# Patient Record
Sex: MALE | Race: WHITE | URBAN - METROPOLITAN AREA
[De-identification: names, ages, dates, MRNs, and addresses within clinical notes are randomized per-mention and may not be internally consistent; named-entity substitution may affect disease eponyms.]

---

## 2022-04-11 ENCOUNTER — HISTORICAL (OUTPATIENT)
Dept: ADMINISTRATIVE | Facility: HOSPITAL | Age: 67
End: 2022-04-11

## 2022-04-29 VITALS
WEIGHT: 180 LBS | BODY MASS INDEX: 26.66 KG/M2 | SYSTOLIC BLOOD PRESSURE: 112 MMHG | DIASTOLIC BLOOD PRESSURE: 88 MMHG | HEIGHT: 69 IN

## 2025-02-12 DIAGNOSIS — M17.0 BILATERAL PRIMARY OSTEOARTHRITIS OF KNEE: Primary | ICD-10-CM

## 2025-03-12 ENCOUNTER — HOSPITAL ENCOUNTER (OUTPATIENT)
Dept: RADIOLOGY | Facility: CLINIC | Age: 70
Discharge: HOME | End: 2025-03-12
Attending: PHYSICIAN ASSISTANT
Payer: MEDICARE

## 2025-03-12 ENCOUNTER — OFFICE VISIT (OUTPATIENT)
Dept: ORTHOPEDICS | Facility: CLINIC | Age: 70
End: 2025-03-12
Payer: MEDICARE

## 2025-03-12 VITALS
DIASTOLIC BLOOD PRESSURE: 86 MMHG | SYSTOLIC BLOOD PRESSURE: 151 MMHG | BODY MASS INDEX: 28.77 KG/M2 | HEIGHT: 68 IN | WEIGHT: 189.81 LBS | HEART RATE: 70 BPM

## 2025-03-12 DIAGNOSIS — M17.0 BILATERAL PRIMARY OSTEOARTHRITIS OF KNEE: Primary | ICD-10-CM

## 2025-03-12 DIAGNOSIS — M17.0 BILATERAL PRIMARY OSTEOARTHRITIS OF KNEE: ICD-10-CM

## 2025-03-12 PROCEDURE — 99203 OFFICE O/P NEW LOW 30 MIN: CPT | Mod: ,,, | Performed by: PHYSICIAN ASSISTANT

## 2025-03-12 PROCEDURE — 73564 X-RAY EXAM KNEE 4 OR MORE: CPT | Mod: 50,,, | Performed by: PHYSICIAN ASSISTANT

## 2025-03-12 RX ORDER — LISINOPRIL 10 MG/1
TABLET ORAL
COMMUNITY

## 2025-03-12 RX ORDER — OMEPRAZOLE 20 MG/1
CAPSULE, DELAYED RELEASE ORAL
COMMUNITY

## 2025-03-12 RX ORDER — CHOLECALCIFEROL (VITAMIN D3) 25 MCG
1000 TABLET ORAL DAILY
COMMUNITY

## 2025-03-12 RX ORDER — ZINC GLUCONATE 50 MG
50 TABLET ORAL DAILY
COMMUNITY

## 2025-03-12 RX ORDER — GUAIFENESIN/PHENYLPROPANOLAMIN
EXPECTORANT ORAL
COMMUNITY

## 2025-03-12 RX ORDER — EZETIMIBE 10 MG/1
TABLET ORAL
COMMUNITY

## 2025-03-12 RX ORDER — MULTIVITAMIN
1 TABLET ORAL DAILY
COMMUNITY

## 2025-03-12 RX ORDER — IBUPROFEN 100 MG/5ML
1000 SUSPENSION, ORAL (FINAL DOSE FORM) ORAL DAILY
COMMUNITY

## 2025-03-12 RX ORDER — GLUCOSAM/CHONDRO/HERB 149/HYAL 750-100 MG
TABLET ORAL
COMMUNITY

## 2025-03-12 RX ORDER — ROSUVASTATIN CALCIUM 20 MG/1
20 TABLET, COATED ORAL
COMMUNITY
Start: 2025-01-27

## 2025-03-12 NOTE — PROGRESS NOTES
Chief Complaint:   Chief Complaint   Patient presents with    Knee Pain     SLIM knee pain - Left knee was scoped. Patient reports L knee pain started after basketball injury 35 years ago. R knee has become progressively worse over time. Pain is localized to the knees, is relatively the same in both knees. . Reports mild swelling with activity. Taking Ibuprofen 200 mg PRN or prior to activity with relief. No PT.        History of present illness:    69-year-old male presents office today with his wife for his bilateral knee pain.  Left knee is worse than the right.  He has a longstanding history of left knee pain stemming from a basketball injury that he had a left knee arthroscopy for the torn meniscus.  Pain is lateral-sided as well as anteriorly.  He has difficulty kneeling on his knees.  He gets swelling with activity.  His pain is worse with weight-bearing.  He denies any locking or catching episodes.  Takes over-the-counter medication with no relief    History reviewed. No pertinent past medical history.    Past Surgical History:   Procedure Laterality Date    HERNIA REPAIR  1961       Current Outpatient Medications   Medication Sig    ascorbic acid, vitamin C, (VITAMIN C) 1000 MG tablet Take 1,000 mg by mouth once daily.    BABY ASPIRIN ORAL Take 81 mg by mouth every morning.    ezetimibe (ZETIA) 10 mg tablet     glucosam-chond-hrb 149-hyal ac (GLUCOS CHOND CPLX ADVANCED) 750 mg-100 mg- 125 mg-1.65 mg Tab     lisinopriL 10 MG tablet     multivitamin (ONE DAILY MULTIVITAMIN) per tablet Take 1 tablet by mouth once daily.    omeprazole (PRILOSEC) 20 MG capsule     rosuvastatin (CRESTOR) 20 MG tablet Take 20 mg by mouth.    saw palmetto 500 MG capsule     vitamin D (VITAMIN D3) 1000 units Tab Take 1,000 Units by mouth once daily.    zinc gluconate 50 mg tablet Take 50 mg by mouth once daily.     No current facility-administered medications for this visit.       Review of patient's allergies indicates:  No Known  "Allergies    Family History   Problem Relation Name Age of Onset    Asthma Mother Pinky Peterson     Heart disease Mother Pinky Peterson     Cancer Father Refugio Deng        Social History[1]      Review of Systems:    Constitution:   Denies chills, fever, and sweats.  HENT:   Denies headaches or blurry vision.  Cardiovascular:  Denies chest pain or irregular heart beat.  Respiratory:   Denies cough or shortness of breath.  Gastrointestinal:  Denies abdominal pain, nausea, or vomiting.  Musculoskeletal:   Denies muscle cramps.  Neurological:   Denies dizziness or focal weakness.  Psychiatric/Behavior: Normal mental status.  Hematology/Lymph:  Denies bleeding problem or easy bruising/bleeding.  Skin:    Denies rash or suspicious lesions.    Examination:    Vital Signs:    Vitals:    03/12/25 1420   BP: (!) 150/89   Pulse: 72   Weight: 86.1 kg (189 lb 12.8 oz)   Height: 5' 8" (1.727 m)       Body mass index is 28.86 kg/m².    Constitution:   Well-developed, well nourished patient in no acute distress.  Neurological:   Alert and oriented x 3 and cooperative to examination.     Psychiatric/Behavior: Normal mental status.  Respiratory:   No shortness of breath.  Nonlabored breathing  Cardiovascular:           Regular rate and rhythm  Eyes:    Extraoccular muscles intact  Skin:    No scars, rash or suspicious lesions.    Physical Exam:     Right Knee:     Grade 1 effusion    Valgus deformity    No erythema or increased heat   Patella demonstrated crepitus.    Anterolateral aspect was tender on palpation. Lateral aspect was tender on palpation. Iliotibial Band was tender on palpation.    No medial joint line pain   Active flexion 120 degrees   Active extension 5 degrees   antalgic gait was observed.     X-Ray Knee: A complete knee x-ray with standing 4 views was performed of the right knee      IMPRESSIONS RADIOLOGY TEST   Narrowing of the joint space bone on bone in lateral compartment, and osteophytes arising from the " knee.  Advanced joint space narrowing in the patellofemoral compartments    Kellgren Levi grade 4 changes    Left Knee:     Grade 1 effusion    Valgus deformity    No erythema or increased heat   Patella demonstrated crepitus.    Anterolateral aspect was tender on palpation. Lateral aspect was tender on palpation. Iliotibial Band was tender on palpation.    No medial joint line pain   Active flexion 120 degrees   Active extension 5 degrees   antalgic gait was observed.     X-Ray Knee: A complete knee x-ray with standing 4 views was performed of the left knee      IMPRESSIONS RADIOLOGY TEST   Narrowing of the joint space bone on bone in lateral compartment, and osteophytes arising from the knee.  Advanced joint space narrowing in the patellofemoral compartments    Kellgren Levi grade 4 changes        Assessment:     Bilateral primary osteoarthritis of knee  -     X-Ray Knee Complete 4 Or More Views Bilat; Future; Expected date: 03/12/2025  -     Ambulatory referral/consult to Orthopedics        Plan:      I have discussed exam and x-ray findings with the patient his wife today.  He has advanced osteoarthritis of both knees.  Conservative treatment consisting of injections, therapy, medication.  Definitive treatment consist of robotic assisted right and left total knee arthroplasty.  He is interested in definitive treatment.  He would like do both knees at 1 time.  He has no medical comorbidities.    Robotic assisted right and left total knee arthroplasty in June per his request    Preoperative physical therapy    I will have him come back in May to further discuss surgical intervention with Dr. Horvath and we will perform his preoperative exam at that time    The proposed procedure as well as associated risks/benefits were discussed at length with the patient and family with risks to include pain, bleeding, infection, future surgeries, neurovascular compromise, loss of limb, heart attack, stroke, deep vein  thrombosis, and even death. They understand and agree with the treatment plan.        No follow-ups on file.    DISCLAIMER: This note may have been dictated using voice recognition software and may contain grammatical errors.          [1]   Social History  Socioeconomic History    Marital status:    Tobacco Use    Smoking status: Never     Passive exposure: Never    Smokeless tobacco: Never   Substance and Sexual Activity    Alcohol use: Not Currently     Comment: Not in  50 years    Drug use: Never    Sexual activity: Not Currently     Partners: Female      none

## 2025-04-02 RX ORDER — CYCLOSPORINE 0.5 MG/ML
1 EMULSION OPHTHALMIC 2 TIMES DAILY
COMMUNITY

## 2025-04-02 RX ORDER — DEXTROMETHORPHAN HYDROBROMIDE, GUAIFENESIN 5; 100 MG/5ML; MG/5ML
1300 LIQUID ORAL EVERY 8 HOURS
COMMUNITY

## 2025-04-07 ENCOUNTER — ANESTHESIA EVENT (OUTPATIENT)
Facility: HOSPITAL | Age: 70
End: 2025-04-07
Payer: MEDICARE

## 2025-04-07 RX ORDER — LIDOCAINE HYDROCHLORIDE 10 MG/ML
1 INJECTION, SOLUTION EPIDURAL; INFILTRATION; INTRACAUDAL; PERINEURAL ONCE
OUTPATIENT
Start: 2025-04-07 | End: 2025-04-07

## 2025-04-07 RX ORDER — SODIUM CHLORIDE, SODIUM GLUCONATE, SODIUM ACETATE, POTASSIUM CHLORIDE AND MAGNESIUM CHLORIDE 30; 37; 368; 526; 502 MG/100ML; MG/100ML; MG/100ML; MG/100ML; MG/100ML
INJECTION, SOLUTION INTRAVENOUS CONTINUOUS
OUTPATIENT
Start: 2025-04-07 | End: 2025-05-07

## 2025-04-07 NOTE — ANESTHESIA PREPROCEDURE EVALUATION
"                                                                                                             04/07/2025  Refugio Peterson is a 69 y.o., male.  Diagnosis: Nuclear sclerotic cataract of right eye [H25.11]   Pre-op diagnosis: Nuclear sclerotic cataract of right eye [H25.11]   Location: [address]           Case: [id number] Date/Time: 04/08/25 0722   Procedure: PHACOEMULSIFICATION, CATARACT, WITH IOL INSERTION     //////right eye; Panoptix Toric; Catalys (Right)   Anesthesia type: Monitor Anesthesia Care     PMHx:  Heart attack    Other Medical History   HLD (hyperlipidemia) GERD (gastroesophageal reflux disease)   CAD (coronary artery disease)          PSHx:  Surgical History:  HERNIA REPAIR KNEE SURGERY   COLONOSCOPY          Vital signs:    Most Recent Value 4/2/2025  1526 3/12/2025  1506 3/12/2025  1420    Height: 5' 9" (175.3 cm)  as of 4/2/2025 5' 9" (175.3 cm) -- 5' 8" (172.7 cm)   Last Wt: 82.6 kg (182 lb)  as of 4/2/2025 82.6 kg (182 lb) -- 86.1 kg (189 lb 12.8 oz)   Body Mass Index: 26.88 kg/m² Abnormal   5' 9" (175.3 cm)  as of 4/2/2025  82.6 kg (182 lb)  as of 4/2/2025      Pulse: 70  as of 3/12/2025 -- 70 72   BP: 151/86 Abnormal   as of 3/12/2025 -- 151/86 Abnormal  150/89 Abnormal         Nervous/in pain   Temp: Not taken -- -- --   SpO2: Not taken            Lab Data:  N/A    EKG:  N/A                Pre-op Assessment    I have reviewed the Patient Summary Reports.     I have reviewed the Nursing Notes. I have reviewed the NPO Status.   I have reviewed the Medications.     Review of Systems  Anesthesia Hx:  No problems with previous Anesthesia                Social:  Non-Smoker       Hematology/Oncology:  Hematology Normal   Oncology Normal                                   EENT/Dental:  EENT/Dental Normal           Cardiovascular:  Exercise tolerance: good     Past MI CAD                  Functional Capacity good / => 4 METS   Coronary Artery Disease:          Hx of " Myocardial Infarction                        Pulmonary:  Pulmonary Normal                       Renal/:  Renal/ Normal                 Hepatic/GI:     GERD         Gerd          Musculoskeletal:  Musculoskeletal Normal                Neurological:  Neurology Normal                                      Endocrine:  Endocrine Normal            Dermatological:  Skin Normal    Psych:  Psychiatric Normal                    Physical Exam  General: Alert, Oriented, Well nourished and Cooperative    Airway:  Mallampati: II   Mouth Opening: Normal  TM Distance: Normal  Tongue: Normal  Neck ROM: Normal ROM    Dental:  Intact    Chest/Lungs:  Clear to auscultation, Normal Respiratory Rate    Heart:  Rate: Normal  Rhythm: Regular Rhythm        Anesthesia Plan  Type of Anesthesia, risks & benefits discussed:    Anesthesia Type: Gen Natural Airway, MAC  Intra-op Monitoring Plan: Standard ASA Monitors  Post Op Pain Control Plan: IV/PO Opioids PRN  Induction:  IV  Informed Consent: Informed consent signed with the Patient and all parties understand the risks and agree with anesthesia plan.  All questions answered.   ASA Score: 3  Day of Surgery Review of History & Physical: H&P Update referred to the surgeon/provider.    Ready For Surgery From Anesthesia Perspective.     .

## 2025-04-08 ENCOUNTER — ANESTHESIA (OUTPATIENT)
Facility: HOSPITAL | Age: 70
End: 2025-04-08
Payer: MEDICARE

## 2025-04-08 ENCOUNTER — HOSPITAL ENCOUNTER (OUTPATIENT)
Facility: HOSPITAL | Age: 70
Discharge: HOME | End: 2025-04-08
Attending: OPHTHALMOLOGY | Admitting: OPHTHALMOLOGY
Payer: MEDICARE

## 2025-04-08 VITALS
TEMPERATURE: 98 F | DIASTOLIC BLOOD PRESSURE: 94 MMHG | BODY MASS INDEX: 26.96 KG/M2 | HEART RATE: 67 BPM | WEIGHT: 182 LBS | OXYGEN SATURATION: 99 % | HEIGHT: 69 IN | SYSTOLIC BLOOD PRESSURE: 166 MMHG

## 2025-04-08 DIAGNOSIS — H26.9 CATARACT: ICD-10-CM

## 2025-04-08 PROCEDURE — 37000008 HC ANESTHESIA 1ST 15 MINUTES: Performed by: OPHTHALMOLOGY

## 2025-04-08 PROCEDURE — 27201423 OPTIME MED/SURG SUP & DEVICES STERILE SUPPLY: Performed by: OPHTHALMOLOGY

## 2025-04-08 PROCEDURE — 36000707: Performed by: OPHTHALMOLOGY

## 2025-04-08 PROCEDURE — 71000015 HC POSTOP RECOV 1ST HR: Performed by: OPHTHALMOLOGY

## 2025-04-08 PROCEDURE — 63600175 PHARM REV CODE 636 W HCPCS: Performed by: OPHTHALMOLOGY

## 2025-04-08 PROCEDURE — 36000706: Performed by: OPHTHALMOLOGY

## 2025-04-08 PROCEDURE — 25000003 PHARM REV CODE 250

## 2025-04-08 PROCEDURE — 25000003 PHARM REV CODE 250: Performed by: OPHTHALMOLOGY

## 2025-04-08 PROCEDURE — 63600175 PHARM REV CODE 636 W HCPCS: Performed by: NURSE ANESTHETIST, CERTIFIED REGISTERED

## 2025-04-08 PROCEDURE — V2788 PRESBYOPIA-CORRECT FUNCTION: HCPCS | Performed by: OPHTHALMOLOGY

## 2025-04-08 PROCEDURE — 37000009 HC ANESTHESIA EA ADD 15 MINS: Performed by: OPHTHALMOLOGY

## 2025-04-08 DEVICE — CLAREON PANOPTIX TORIC UVA
Type: IMPLANTABLE DEVICE | Site: EYE | Status: FUNCTIONAL
Brand: CLAREON

## 2025-04-08 RX ORDER — BUPIVACAINE HYDROCHLORIDE 7.5 MG/ML
INJECTION, SOLUTION EPIDURAL; RETROBULBAR
Status: DISCONTINUED | OUTPATIENT
Start: 2025-04-08 | End: 2025-04-08 | Stop reason: HOSPADM

## 2025-04-08 RX ORDER — PHENYLEPHRINE HYDROCHLORIDE 100 MG/ML
1 SOLUTION/ DROPS OPHTHALMIC
Status: COMPLETED | OUTPATIENT
Start: 2025-04-08 | End: 2025-04-08

## 2025-04-08 RX ORDER — MIDAZOLAM HYDROCHLORIDE 1 MG/ML
INJECTION INTRAMUSCULAR; INTRAVENOUS
Status: DISCONTINUED | OUTPATIENT
Start: 2025-04-08 | End: 2025-04-08

## 2025-04-08 RX ORDER — DEXAMETHASONE SODIUM PHOSPHATE 10 MG/ML
INJECTION, SOLUTION INTRA-ARTICULAR; INTRALESIONAL; INTRAMUSCULAR; INTRAVENOUS; SOFT TISSUE
Status: DISCONTINUED | OUTPATIENT
Start: 2025-04-08 | End: 2025-04-08 | Stop reason: HOSPADM

## 2025-04-08 RX ORDER — POVIDONE-IODINE 5 %
SOLUTION, NON-ORAL OPHTHALMIC (EYE)
Status: DISCONTINUED | OUTPATIENT
Start: 2025-04-08 | End: 2025-04-08 | Stop reason: HOSPADM

## 2025-04-08 RX ORDER — ATROPINE SULFATE 10 MG/ML
1 SOLUTION/ DROPS OPHTHALMIC
Status: COMPLETED | OUTPATIENT
Start: 2025-04-08 | End: 2025-04-08

## 2025-04-08 RX ORDER — GLUCAGON 1 MG
1 KIT INJECTION
Status: DISCONTINUED | OUTPATIENT
Start: 2025-04-08 | End: 2025-04-08 | Stop reason: HOSPADM

## 2025-04-08 RX ORDER — CEFAZOLIN SODIUM 1 G/3ML
INJECTION, POWDER, FOR SOLUTION INTRAMUSCULAR; INTRAVENOUS
Status: DISCONTINUED | OUTPATIENT
Start: 2025-04-08 | End: 2025-04-08 | Stop reason: HOSPADM

## 2025-04-08 RX ORDER — LIDOCAINE HYDROCHLORIDE 10 MG/ML
INJECTION, SOLUTION EPIDURAL; INFILTRATION; INTRACAUDAL; PERINEURAL
Status: DISCONTINUED | OUTPATIENT
Start: 2025-04-08 | End: 2025-04-08 | Stop reason: HOSPADM

## 2025-04-08 RX ORDER — EPINEPHRINE 1 MG/ML
INJECTION INTRAMUSCULAR; INTRAVENOUS; SUBCUTANEOUS
Status: DISCONTINUED | OUTPATIENT
Start: 2025-04-08 | End: 2025-04-08 | Stop reason: HOSPADM

## 2025-04-08 RX ORDER — ACETAMINOPHEN 500 MG
1000 TABLET ORAL EVERY 6 HOURS PRN
Status: DISCONTINUED | OUTPATIENT
Start: 2025-04-08 | End: 2025-04-08 | Stop reason: HOSPADM

## 2025-04-08 RX ORDER — TROPICAMIDE 10 MG/ML
1 SOLUTION/ DROPS OPHTHALMIC
Status: COMPLETED | OUTPATIENT
Start: 2025-04-08 | End: 2025-04-08

## 2025-04-08 RX ORDER — CYCLOPENTOLATE HYDROCHLORIDE 10 MG/ML
1 SOLUTION/ DROPS OPHTHALMIC
Status: COMPLETED | OUTPATIENT
Start: 2025-04-08 | End: 2025-04-08

## 2025-04-08 RX ADMIN — TROPICAMIDE 1 DROP: 10 SOLUTION/ DROPS OPHTHALMIC at 06:04

## 2025-04-08 RX ADMIN — CYCLOPENTOLATE HYDROCHLORIDE 1 DROP: 10 SOLUTION/ DROPS OPHTHALMIC at 06:04

## 2025-04-08 RX ADMIN — ACETAMINOPHEN 1000 MG: 500 TABLET ORAL at 06:04

## 2025-04-08 RX ADMIN — PHENYLEPHRINE HYDROCHLORIDE 1 DROP: 100 SOLUTION/ DROPS OPHTHALMIC at 06:04

## 2025-04-08 RX ADMIN — ATROPINE SULFATE 1 DROP: 10 SOLUTION/ DROPS OPHTHALMIC at 06:04

## 2025-04-08 RX ADMIN — MIDAZOLAM HYDROCHLORIDE 2 MG: 1 INJECTION, SOLUTION INTRAMUSCULAR; INTRAVENOUS at 07:04

## 2025-04-08 NOTE — ANESTHESIA POSTPROCEDURE EVALUATION
Anesthesia Post Evaluation    Patient: Refugio Peterson    Procedure(s) Performed: Procedure(s) (LRB):  PHACOEMULSIFICATION, CATARACT, WITH IOL INSERTION     //////right eye; Panoptix Toric; Catalys (Right)    Final Anesthesia Type: MAC      Patient location during evaluation: OPS  Patient participation: Yes- Able to Participate  Level of consciousness: awake and alert and oriented  Post-procedure vital signs: reviewed and stable  Pain management: adequate  Airway patency: patent    PONV status at discharge: No PONV  Anesthetic complications: no      Cardiovascular status: blood pressure returned to baseline and stable  Respiratory status: unassisted, spontaneous ventilation and room air  Hydration status: euvolemic  Follow-up not needed.              Vitals Value Taken Time   /88 04/08/25 06:18   Temp 36.6 °C (97.9 °F) 04/08/25 06:18   Pulse 70 04/08/25 06:18   Resp 20 04/08/25 07:58   SpO2 98 % 04/08/25 06:18         No case tracking events are documented in the log.      Pain/Belén Score: Pain Rating Prior to Med Admin: 0 (4/8/2025  6:20 AM)

## 2025-04-08 NOTE — DISCHARGE INSTRUCTIONS
[facility], Care After    Refer to this sheet in the next few weeks. These instructions provide you with information on caring for yourself after your procedure. Your health care provider may also give you more specific instructions. Your treatment has been planned according to current medical practices, but problems sometimes occur. Call your health care provider if you have any problems or questions after your procedure.    WHAT TO EXPECT AFTER THE PROCEDURE  After your procedure, it is typical to have the following:  Changes in depth perception.  Blurry vision.  Eye discomfort.    HOME CARE INSTRUCTIONS  Keep all follow-up visits as directed by your health care provider. This is important.  You may receive medicine to help with pain or discomfort. Take medicines only as directed by your health care provider.  Leave patch on overnight, keep it clean and dry. Dr Reyna will remove it a your follow up appointment.   Avoid sleeping on your stomach or operative side. Sleep on your back with your head elevated on 2-3 pillows.  No strenuous activity, heavy lifting, bending over, or straining of any kind.  No eye drops overnight into surgery eye. Bring all eye drops to your follow up appointment.  Resume all other meds taken at home.  Continue eye drops in the non-surgical eye.    Meds:  Tylenol 500 mg 2 tabs  by mouth every six hours as needed for pain.  Next dose at __12:20pm_____ (mild pain)  You may take ibuprofen or your prescribed Toradol every 6 hours as needed for pain next dose at _anytime_ (moderate pain)  You may take Ondansetron (Zofran) 12.5mg 1 tab every 4-6 hours as needed for nausea next dose at any time.     Avoid:  Rubbing your eyes.  Smoking.  Contact sports, strenuous yard work, housework, and swimming.  Lifting heavy objects and bending for 1-2 weeks.    SEEK MEDICAL CARE IF:  You notice signs of infection in your eye.  You develop increased pain, inflammation, or swelling.  You notice  visual changes or a pus-like drainage.    SEEK IMMEDIATE MEDICAL CARE IF:  You lose all vision.

## 2025-04-08 NOTE — TRANSFER OF CARE
"Anesthesia Transfer of Care Note    Patient: Refugio Peterson    Procedure(s) Performed: Procedure(s) (LRB):  PHACOEMULSIFICATION, CATARACT, WITH IOL INSERTION     //////right eye; Panoptix Toric; Catalys (Right)    Patient location: OPS    Anesthesia Type: MAC    Transport from OR: Transported from OR on room air with adequate spontaneous ventilation    Post pain: adequate analgesia    Post assessment: no apparent anesthetic complications    Post vital signs: stable    Level of consciousness: sedated    Nausea/Vomiting: no nausea/vomiting    Complications: none    Transfer of care protocol was followed      Last vitals: Visit Vitals  BP (!) 175/88   Pulse 70   Temp 36.6 °C (97.9 °F) (Oral)   Ht 5' 9" (1.753 m)   Wt 82.6 kg (182 lb)   SpO2 98%   BMI 26.88 kg/m²     "

## 2025-04-08 NOTE — OP NOTE
DATE OF SURGERY:  4/8/2025     SURGEON:  Ellie Reyna MD    PREOPERATIVE DIAGNOSIS:  Nucleosclerotic cataract of the right eye.    POST OPERATIVE DIAGNOSIS:  Nucleosclerotic cataract of the right eye.    PROCEDURE:  Cataract extraction with intraocular lens implantation of the right eye.    ANESTHESIA:  Local plus MAC.    COMPLICATIONS:  None.    ESTIMATED BLOOD LOSS:  None.    IMPLANTS: ccwtt4 20.5    After discussion of risks, benefits and alternative with the patient and family, informed consent was obtained by the patient in clinic including but not limited to bleeding, infection, decreased vision, loss of the eye, need for subsequent surgery.  The patient understands and wishes to proceed.    PROCEDURE IN DETAIL:    The patient was brought into the operating room and laid in supine manner.  After anesthesia was undertaken without complication, the right eye and periorbital region were prepped and draped in usual manner for intraocular surgery while a speculum was placed in the operative eye for adequate exposure. A paracentesis incision was made at approximately 11 o'clock with a clear cornea at the limbus. Preservative free Lidocaine and epinephrine was injected into the anterior chamber followed by viscoelastic.  A triplanar cataract wound was then created approximately 7 o'clock through clear cornea at the limbus.  Curvilinear capsulorrhexis was created without complication.     BSS sonic cannula was used to hydrodissect the lens.  The lens was found to move freely within the capsular bag.  Lens was then removed in divide and conquer technique.  Remaining cortical material was removed with I&A handpiece.  The lens was then implanted into the capsular bag. The remaining viscoelastic was then removed with the irrigation aspiration handpiece. The wounds were hydrated and postop injections given  The patient tolerated the procedure well.  Eyelid speculum was removed followed by pressure patch and  shield.  The patient was turned over to anesthesia in stable condition.

## 2025-04-21 ENCOUNTER — ANESTHESIA EVENT (OUTPATIENT)
Facility: HOSPITAL | Age: 70
End: 2025-04-21
Payer: MEDICARE

## 2025-04-22 ENCOUNTER — HOSPITAL ENCOUNTER (OUTPATIENT)
Facility: HOSPITAL | Age: 70
Discharge: HOME | End: 2025-04-22
Attending: OPHTHALMOLOGY | Admitting: OPHTHALMOLOGY
Payer: MEDICARE

## 2025-04-22 ENCOUNTER — ANESTHESIA (OUTPATIENT)
Facility: HOSPITAL | Age: 70
End: 2025-04-22
Payer: MEDICARE

## 2025-04-22 VITALS
HEART RATE: 62 BPM | RESPIRATION RATE: 17 BRPM | DIASTOLIC BLOOD PRESSURE: 85 MMHG | HEIGHT: 69 IN | BODY MASS INDEX: 26.96 KG/M2 | OXYGEN SATURATION: 96 % | SYSTOLIC BLOOD PRESSURE: 142 MMHG | WEIGHT: 182 LBS | TEMPERATURE: 98 F

## 2025-04-22 DIAGNOSIS — H26.9 CATARACT: ICD-10-CM

## 2025-04-22 PROCEDURE — 36000706: Performed by: OPHTHALMOLOGY

## 2025-04-22 PROCEDURE — 63600175 PHARM REV CODE 636 W HCPCS: Performed by: OPHTHALMOLOGY

## 2025-04-22 PROCEDURE — 25000003 PHARM REV CODE 250

## 2025-04-22 PROCEDURE — 37000008 HC ANESTHESIA 1ST 15 MINUTES: Performed by: OPHTHALMOLOGY

## 2025-04-22 PROCEDURE — 27201423 OPTIME MED/SURG SUP & DEVICES STERILE SUPPLY: Performed by: OPHTHALMOLOGY

## 2025-04-22 PROCEDURE — 37000009 HC ANESTHESIA EA ADD 15 MINS: Performed by: OPHTHALMOLOGY

## 2025-04-22 PROCEDURE — 71000015 HC POSTOP RECOV 1ST HR: Performed by: OPHTHALMOLOGY

## 2025-04-22 PROCEDURE — D9220A PRA ANESTHESIA: Mod: ,,,

## 2025-04-22 PROCEDURE — V2788 PRESBYOPIA-CORRECT FUNCTION: HCPCS | Performed by: OPHTHALMOLOGY

## 2025-04-22 PROCEDURE — 25000003 PHARM REV CODE 250: Performed by: OPHTHALMOLOGY

## 2025-04-22 PROCEDURE — 63600175 PHARM REV CODE 636 W HCPCS

## 2025-04-22 PROCEDURE — 36000707: Performed by: OPHTHALMOLOGY

## 2025-04-22 RX ORDER — BUPIVACAINE HYDROCHLORIDE 7.5 MG/ML
INJECTION, SOLUTION EPIDURAL; RETROBULBAR
Status: DISCONTINUED | OUTPATIENT
Start: 2025-04-22 | End: 2025-04-22 | Stop reason: HOSPADM

## 2025-04-22 RX ORDER — PHENYLEPHRINE HYDROCHLORIDE 100 MG/ML
1 SOLUTION/ DROPS OPHTHALMIC
Status: COMPLETED | OUTPATIENT
Start: 2025-04-22 | End: 2025-04-22

## 2025-04-22 RX ORDER — ACETAMINOPHEN 500 MG
1000 TABLET ORAL ONCE
Status: COMPLETED | OUTPATIENT
Start: 2025-04-22 | End: 2025-04-22

## 2025-04-22 RX ORDER — DEXAMETHASONE SODIUM PHOSPHATE 10 MG/ML
INJECTION, SOLUTION INTRA-ARTICULAR; INTRALESIONAL; INTRAMUSCULAR; INTRAVENOUS; SOFT TISSUE
Status: DISCONTINUED | OUTPATIENT
Start: 2025-04-22 | End: 2025-04-22 | Stop reason: HOSPADM

## 2025-04-22 RX ORDER — TROPICAMIDE 10 MG/ML
1 SOLUTION/ DROPS OPHTHALMIC
Status: COMPLETED | OUTPATIENT
Start: 2025-04-22 | End: 2025-04-22

## 2025-04-22 RX ORDER — GLUCAGON 1 MG
1 KIT INJECTION
Status: DISCONTINUED | OUTPATIENT
Start: 2025-04-22 | End: 2025-04-22 | Stop reason: HOSPADM

## 2025-04-22 RX ORDER — ATROPINE SULFATE 10 MG/ML
1 SOLUTION/ DROPS OPHTHALMIC
Status: COMPLETED | OUTPATIENT
Start: 2025-04-22 | End: 2025-04-22

## 2025-04-22 RX ORDER — EPINEPHRINE 1 MG/ML
INJECTION INTRAMUSCULAR; INTRAVENOUS; SUBCUTANEOUS
Status: DISCONTINUED | OUTPATIENT
Start: 2025-04-22 | End: 2025-04-22 | Stop reason: HOSPADM

## 2025-04-22 RX ORDER — ONDANSETRON HYDROCHLORIDE 2 MG/ML
4 INJECTION, SOLUTION INTRAVENOUS ONCE AS NEEDED
Status: DISCONTINUED | OUTPATIENT
Start: 2025-04-22 | End: 2025-04-22 | Stop reason: HOSPADM

## 2025-04-22 RX ORDER — LIDOCAINE HYDROCHLORIDE 10 MG/ML
INJECTION, SOLUTION EPIDURAL; INFILTRATION; INTRACAUDAL; PERINEURAL
Status: DISCONTINUED | OUTPATIENT
Start: 2025-04-22 | End: 2025-04-22 | Stop reason: HOSPADM

## 2025-04-22 RX ORDER — CEFAZOLIN SODIUM 1 G/3ML
INJECTION, POWDER, FOR SOLUTION INTRAMUSCULAR; INTRAVENOUS
Status: DISCONTINUED | OUTPATIENT
Start: 2025-04-22 | End: 2025-04-22 | Stop reason: HOSPADM

## 2025-04-22 RX ORDER — CYCLOPENTOLATE HYDROCHLORIDE 10 MG/ML
1 SOLUTION/ DROPS OPHTHALMIC
Status: COMPLETED | OUTPATIENT
Start: 2025-04-22 | End: 2025-04-22

## 2025-04-22 RX ORDER — POVIDONE-IODINE 5 %
SOLUTION, NON-ORAL OPHTHALMIC (EYE)
Status: DISCONTINUED | OUTPATIENT
Start: 2025-04-22 | End: 2025-04-22 | Stop reason: HOSPADM

## 2025-04-22 RX ORDER — FENTANYL CITRATE 50 UG/ML
INJECTION, SOLUTION INTRAMUSCULAR; INTRAVENOUS
Status: DISCONTINUED | OUTPATIENT
Start: 2025-04-22 | End: 2025-04-22

## 2025-04-22 RX ORDER — MIDAZOLAM HYDROCHLORIDE 1 MG/ML
INJECTION INTRAMUSCULAR; INTRAVENOUS
Status: DISCONTINUED | OUTPATIENT
Start: 2025-04-22 | End: 2025-04-22

## 2025-04-22 RX ORDER — ONDANSETRON HYDROCHLORIDE 2 MG/ML
INJECTION, SOLUTION INTRAVENOUS
Status: DISCONTINUED | OUTPATIENT
Start: 2025-04-22 | End: 2025-04-22

## 2025-04-22 RX ORDER — ACETAZOLAMIDE 250 MG/1
500 TABLET ORAL ONCE
Status: COMPLETED | OUTPATIENT
Start: 2025-04-22 | End: 2025-04-22

## 2025-04-22 RX ADMIN — MIDAZOLAM HYDROCHLORIDE 1 MG: 1 INJECTION, SOLUTION INTRAMUSCULAR; INTRAVENOUS at 08:04

## 2025-04-22 RX ADMIN — PHENYLEPHRINE HYDROCHLORIDE 1 DROP: 100 SOLUTION/ DROPS OPHTHALMIC at 06:04

## 2025-04-22 RX ADMIN — TROPICAMIDE 1 DROP: 10 SOLUTION/ DROPS OPHTHALMIC at 06:04

## 2025-04-22 RX ADMIN — CYCLOPENTOLATE HYDROCHLORIDE 1 DROP: 10 SOLUTION/ DROPS OPHTHALMIC at 06:04

## 2025-04-22 RX ADMIN — FENTANYL CITRATE 50 MCG: 50 INJECTION INTRAMUSCULAR; INTRAVENOUS at 08:04

## 2025-04-22 RX ADMIN — ATROPINE SULFATE 1 DROP: 10 SOLUTION/ DROPS OPHTHALMIC at 06:04

## 2025-04-22 RX ADMIN — ACETAMINOPHEN 1000 MG: 500 TABLET ORAL at 06:04

## 2025-04-22 RX ADMIN — FENTANYL CITRATE 50 MCG: 50 INJECTION INTRAMUSCULAR; INTRAVENOUS at 09:04

## 2025-04-22 RX ADMIN — ONDANSETRON HYDROCHLORIDE 4 MG: 2 SOLUTION INTRAMUSCULAR; INTRAVENOUS at 08:04

## 2025-04-22 RX ADMIN — ACETAZOLAMIDE 500 MG: 250 TABLET ORAL at 06:04

## 2025-04-22 NOTE — DISCHARGE INSTRUCTIONS
[facility], Care After    Refer to this sheet in the next few weeks. These instructions provide you with information on caring for yourself after your procedure. Your health care provider may also give you more specific instructions. Your treatment has been planned according to current medical practices, but problems sometimes occur. Call your health care provider if you have any problems or questions after your procedure.    WHAT TO EXPECT AFTER THE PROCEDURE  After your procedure, it is typical to have the following:  Changes in depth perception.  Blurry vision.  Eye discomfort.    HOME CARE INSTRUCTIONS  Keep all follow-up visits as directed by your health care provider. This is important.  You may receive medicine to help with pain or discomfort. Take medicines only as directed by your health care provider.  Leave patch on overnight, keep it clean and dry. Dr Reyna will remove it a your follow up appointment.   Avoid sleeping on your stomach or operative side. Sleep on your back with your head elevated on 2-3 pillows.  No strenuous activity, heavy lifting, bending over, or straining of any kind.  No eye drops overnight into surgery eye. Bring all eye drops to your follow up appointment.  Resume all other meds taken at home.  Continue eye drops in the non-surgical eye.    Meds:  Tylenol 500 mg 2 tabs  by mouth every six hours as needed for pain.  Next dose at _11 am ________ (mild pain)  You may take ibuprofen or your prescribed Toradol every 6 hours as needed for pain next dose at __anytime ___ (moderate pain)  You may take Ondansetron (Zofran) 12.5mg 1 tab every 4-6 hours as needed for nausea next dose at any time.     Avoid:  Rubbing your eyes.  Smoking.  Contact sports, strenuous yard work, housework, and swimming.  Lifting heavy objects and bending for 1-2 weeks.    SEEK MEDICAL CARE IF:  You notice signs of infection in your eye.  You develop increased pain, inflammation, or swelling.  You notice  visual changes or a pus-like drainage.    SEEK IMMEDIATE MEDICAL CARE IF:  You lose all vision.

## 2025-04-22 NOTE — OP NOTE
DATE OF SURGERY:  4/22/2025     SURGEON:  Ellie Reyna MD    PREOPERATIVE DIAGNOSIS:  Nuclear sclerotic cataract, left eye.    POSTOPERATIVE DIAGNOSIS:  Same, status post procedure.    PROCEDURE:  Cataract extraction with intraocular lens implant of the left eye.     IOL:ccwtt4 19.5    Anesthesia:  Local plus MAC.   Complications:  None.   Estimated blood loss:  0    PROCEDURE IN DETAIL:  After discussing risks, benefits and alternatives with the patient and family, informed consent was obtained from the patient in clinic.  The patient was brought to the operating room and placed in supine position.  MAC anesthesia was undertaken without complications.  The operative eye and periorbital region were prepped and draped in the usual manner for intraocular surgery.  A wire speculum was placed in the operative eye for adequate exposure.  A paracentesis was made at 4 o'clock through clear cornea at the limbus.  Viscoelastic was injected into the anterior chamber.  A triplanar cataract incision was made at 2 o'clock through clear cornea at the limbus.  A curvilinear capsulorrhexis was created with a cystitome and finished with a Utrata forceps.  BSS in the cannula was used to hydrodissect the lens, and the lens was found to move freely within the capsular bag.  The lens was then removed in divide and conquer technique.  The remaining cortical material was removed with the irrigation aspiration handpiece.  A lens was then inserted into the capsular bag an remained in good position.  The remaining viscoelastic was then removed with the irrigation aspiration handpiece.  The wound was hydrated and found to be watertight. Postop injections were given. The wire speculum was then removed, and the patient was cleaned in wet to dry fashion, followed by a shield.  The patient tolerated the procedure well and left the operating room in stable condition.

## 2025-04-22 NOTE — ANESTHESIA PREPROCEDURE EVALUATION
04/21/2025  Refugio Peterson is a 69 y.o., male with a symptomatic cataract, who presents for a PKE / IOL implant, CATALYS, Panoptix, OS.    [facility] with previous Cataract Surgery under MAC anesthesia      Past Medical History:   Diagnosis Date    CAD (coronary artery disease) 2010    w/stent    GERD (gastroesophageal reflux disease)     Heart attack 2010     maker    HLD (hyperlipidemia)     Hypertension          Pre-op Assessment    I have reviewed the Patient Summary Reports.     I have reviewed the Nursing Notes. I have reviewed the NPO Status.   I have reviewed the Medications.     Review of Systems  Anesthesia Hx:  No problems with previous Anesthesia             Denies Family Hx of Anesthesia complications.    Denies Personal Hx of Anesthesia complications.                    Cardiovascular:     Hypertension  Past MI                CAD, s/p MI, PCI                           Pulmonary:  Pulmonary Normal                       Hepatic/GI:     GERD                Endocrine:  Endocrine Normal                Physical Exam  General: Alert and Oriented    Airway:  Mallampati: II   Mouth Opening: Normal  TM Distance: Normal  Tongue: Normal  Neck ROM: Normal ROM    Dental:  Intact    Chest/Lungs:  Normal Respiratory Rate    Heart:  Rate: Normal  Rhythm: Regular Rhythm        Anesthesia Plan  Type of Anesthesia, risks & benefits discussed:    Anesthesia Type: MAC  Intra-op Monitoring Plan: Standard ASA Monitors  Post Op Pain Control Plan: IV/PO Opioids PRN  Induction:  IV  Airway Plan: Direct  Informed Consent: Informed consent signed with the Patient and all parties understand the risks and agree with anesthesia plan.  All questions answered. Patient consented to blood products? No  ASA Score: 3  Day of Surgery Review of History & Physical: H&P Update referred to the surgeon/provider.  Anesthesia Plan Notes:  Nasal Cannula vs O2 Via Facemask  Topical Local Anesthesia per Surgeon    Ready For Surgery From Anesthesia Perspective.     .

## 2025-04-22 NOTE — ANESTHESIA POSTPROCEDURE EVALUATION
Anesthesia Post Evaluation    Patient: Refugio Peterson    Procedure(s) Performed: Procedure(s) (LRB):  PHACOEMULSIFICATION, CATARACT, WITH IOL INSERTION     //////left eye; Panoptix Toric; Catalys; Atropine; Diamox (Left)    Final Anesthesia Type: MAC      Patient location during evaluation: OPS  Patient participation: Yes- Able to Participate  Level of consciousness: awake and alert  Post-procedure vital signs: reviewed and stable  Pain management: adequate  Airway patency: patent    PONV status at discharge: No PONV  Anesthetic complications: no      Cardiovascular status: blood pressure returned to baseline  Respiratory status: unassisted, room air and spontaneous ventilation  Hydration status: euvolemic  Follow-up not needed.              Vitals Value Taken Time   /85 04/22/25 06:44   Temp 36.4 °C (97.5 °F) 04/22/25 06:44   Pulse 61 04/22/25 06:44   Resp 14 04/22/25 08:53   SpO2 96 % 04/22/25 06:44         No case tracking events are documented in the log.      Pain/Belén Score: Pain Rating Prior to Med Admin: 0 (4/22/2025  6:49 AM)

## 2025-05-14 ENCOUNTER — HOSPITAL ENCOUNTER (INPATIENT)
Facility: HOSPITAL | Age: 70
LOS: 2 days | Discharge: HOME | DRG: 123 | End: 2025-05-16
Attending: EMERGENCY MEDICINE | Admitting: INTERNAL MEDICINE
Payer: MEDICARE

## 2025-05-14 DIAGNOSIS — Z98.49 STATUS POST CATARACT EXTRACTION, UNSPECIFIED LATERALITY: ICD-10-CM

## 2025-05-14 DIAGNOSIS — R07.9 CHEST PAIN: ICD-10-CM

## 2025-05-14 DIAGNOSIS — H54.7 UNSPECIFIED VISUAL LOSS: ICD-10-CM

## 2025-05-14 DIAGNOSIS — H53.133 ACUTE LOSS OF VISION, BILATERAL: Primary | ICD-10-CM

## 2025-05-14 DIAGNOSIS — R51.9 NONINTRACTABLE HEADACHE, UNSPECIFIED CHRONICITY PATTERN, UNSPECIFIED HEADACHE TYPE: ICD-10-CM

## 2025-05-14 DIAGNOSIS — H46.9 OPTIC NEURITIS: ICD-10-CM

## 2025-05-14 LAB
ALBUMIN SERPL-MCNC: 4.2 G/DL (ref 3.4–4.8)
ALBUMIN/GLOB SERPL: 1.4 RATIO (ref 1.1–2)
ALP SERPL-CCNC: 47 UNIT/L (ref 40–150)
ALT SERPL-CCNC: 21 UNIT/L (ref 0–55)
ANION GAP SERPL CALC-SCNC: 9 MEQ/L
AST SERPL-CCNC: 15 UNIT/L (ref 11–45)
BASOPHILS # BLD AUTO: 0.01 X10(3)/MCL
BASOPHILS NFR BLD AUTO: 0.2 %
BILIRUB SERPL-MCNC: 0.4 MG/DL
BUN SERPL-MCNC: 15.5 MG/DL (ref 8.4–25.7)
CALCIUM SERPL-MCNC: 9.4 MG/DL (ref 8.8–10)
CHLORIDE SERPL-SCNC: 105 MMOL/L (ref 98–107)
CO2 SERPL-SCNC: 25 MMOL/L (ref 23–31)
CREAT SERPL-MCNC: 0.86 MG/DL (ref 0.72–1.25)
CREAT/UREA NIT SERPL: 18
CRP SERPL-MCNC: <1 MG/L
EOSINOPHIL # BLD AUTO: 0.2 X10(3)/MCL (ref 0–0.9)
EOSINOPHIL NFR BLD AUTO: 3 %
ERYTHROCYTE [DISTWIDTH] IN BLOOD BY AUTOMATED COUNT: 13.3 % (ref 11.5–17)
ERYTHROCYTE [SEDIMENTATION RATE] IN BLOOD: 3 MM/HR (ref 0–20)
GFR SERPLBLD CREATININE-BSD FMLA CKD-EPI: >60 ML/MIN/1.73/M2
GLOBULIN SER-MCNC: 3.1 GM/DL (ref 2.4–3.5)
GLUCOSE SERPL-MCNC: 100 MG/DL (ref 82–115)
HCT VFR BLD AUTO: 47.4 % (ref 42–52)
HGB BLD-MCNC: 15.5 G/DL (ref 14–18)
IMM GRANULOCYTES # BLD AUTO: 0.03 X10(3)/MCL (ref 0–0.04)
IMM GRANULOCYTES NFR BLD AUTO: 0.5 %
LYMPHOCYTES # BLD AUTO: 2.45 X10(3)/MCL (ref 0.6–4.6)
LYMPHOCYTES NFR BLD AUTO: 37 %
MCH RBC QN AUTO: 29.8 PG (ref 27–31)
MCHC RBC AUTO-ENTMCNC: 32.7 G/DL (ref 33–36)
MCV RBC AUTO: 91.2 FL (ref 80–94)
MONOCYTES # BLD AUTO: 0.76 X10(3)/MCL (ref 0.1–1.3)
MONOCYTES NFR BLD AUTO: 11.5 %
NEUTROPHILS # BLD AUTO: 3.18 X10(3)/MCL (ref 2.1–9.2)
NEUTROPHILS NFR BLD AUTO: 47.8 %
NRBC BLD AUTO-RTO: 0 %
PLATELET # BLD AUTO: 276 X10(3)/MCL (ref 130–400)
PMV BLD AUTO: 10.4 FL (ref 7.4–10.4)
POTASSIUM SERPL-SCNC: 4.1 MMOL/L (ref 3.5–5.1)
PROT SERPL-MCNC: 7.3 GM/DL (ref 5.8–7.6)
RBC # BLD AUTO: 5.2 X10(6)/MCL (ref 4.7–6.1)
SODIUM SERPL-SCNC: 139 MMOL/L (ref 136–145)
WBC # BLD AUTO: 6.63 X10(3)/MCL (ref 4.5–11.5)

## 2025-05-14 PROCEDURE — 11000001 HC ACUTE MED/SURG PRIVATE ROOM

## 2025-05-14 PROCEDURE — 99285 EMERGENCY DEPT VISIT HI MDM: CPT | Mod: 25

## 2025-05-14 PROCEDURE — 63600175 PHARM REV CODE 636 W HCPCS: Mod: JZ,TB | Performed by: STUDENT IN AN ORGANIZED HEALTH CARE EDUCATION/TRAINING PROGRAM

## 2025-05-14 PROCEDURE — 25500020 PHARM REV CODE 255: Performed by: INTERNAL MEDICINE

## 2025-05-14 PROCEDURE — 86140 C-REACTIVE PROTEIN: CPT | Performed by: EMERGENCY MEDICINE

## 2025-05-14 PROCEDURE — 86039 ANTINUCLEAR ANTIBODIES (ANA): CPT | Performed by: STUDENT IN AN ORGANIZED HEALTH CARE EDUCATION/TRAINING PROGRAM

## 2025-05-14 PROCEDURE — 25000003 PHARM REV CODE 250: Performed by: NURSE PRACTITIONER

## 2025-05-14 PROCEDURE — 86363 MOG-IGG1 ANTB FLO CYTMTRY EA: CPT | Performed by: STUDENT IN AN ORGANIZED HEALTH CARE EDUCATION/TRAINING PROGRAM

## 2025-05-14 PROCEDURE — 80053 COMPREHEN METABOLIC PANEL: CPT | Performed by: EMERGENCY MEDICINE

## 2025-05-14 PROCEDURE — 85025 COMPLETE CBC W/AUTO DIFF WBC: CPT | Performed by: EMERGENCY MEDICINE

## 2025-05-14 PROCEDURE — A9577 INJ MULTIHANCE: HCPCS | Performed by: INTERNAL MEDICINE

## 2025-05-14 PROCEDURE — 85652 RBC SED RATE AUTOMATED: CPT | Performed by: EMERGENCY MEDICINE

## 2025-05-14 PROCEDURE — 83520 IMMUNOASSAY QUANT NOS NONAB: CPT | Performed by: STUDENT IN AN ORGANIZED HEALTH CARE EDUCATION/TRAINING PROGRAM

## 2025-05-14 PROCEDURE — 82164 ANGIOTENSIN I ENZYME TEST: CPT | Performed by: STUDENT IN AN ORGANIZED HEALTH CARE EDUCATION/TRAINING PROGRAM

## 2025-05-14 PROCEDURE — 63600175 PHARM REV CODE 636 W HCPCS: Performed by: INTERNAL MEDICINE

## 2025-05-14 PROCEDURE — 25000003 PHARM REV CODE 250: Performed by: STUDENT IN AN ORGANIZED HEALTH CARE EDUCATION/TRAINING PROGRAM

## 2025-05-14 RX ORDER — IBUPROFEN 200 MG
24 TABLET ORAL
Status: DISCONTINUED | OUTPATIENT
Start: 2025-05-14 | End: 2025-05-16 | Stop reason: HOSPADM

## 2025-05-14 RX ORDER — PANTOPRAZOLE SODIUM 40 MG/1
40 TABLET, DELAYED RELEASE ORAL DAILY
Status: DISCONTINUED | OUTPATIENT
Start: 2025-05-15 | End: 2025-05-16 | Stop reason: HOSPADM

## 2025-05-14 RX ORDER — CHOLECALCIFEROL (VITAMIN D3) 25 MCG
1000 TABLET ORAL DAILY
Status: DISCONTINUED | OUTPATIENT
Start: 2025-05-15 | End: 2025-05-16 | Stop reason: HOSPADM

## 2025-05-14 RX ORDER — IBUPROFEN 200 MG
16 TABLET ORAL
Status: DISCONTINUED | OUTPATIENT
Start: 2025-05-14 | End: 2025-05-16 | Stop reason: HOSPADM

## 2025-05-14 RX ORDER — LISINOPRIL 10 MG/1
10 TABLET ORAL DAILY
Status: DISCONTINUED | OUTPATIENT
Start: 2025-05-15 | End: 2025-05-16 | Stop reason: HOSPADM

## 2025-05-14 RX ORDER — ACETAMINOPHEN 325 MG/1
650 TABLET ORAL EVERY 8 HOURS PRN
Status: DISCONTINUED | OUTPATIENT
Start: 2025-05-14 | End: 2025-05-16 | Stop reason: HOSPADM

## 2025-05-14 RX ORDER — EZETIMIBE 10 MG/1
10 TABLET ORAL DAILY
Status: DISCONTINUED | OUTPATIENT
Start: 2025-05-15 | End: 2025-05-16 | Stop reason: HOSPADM

## 2025-05-14 RX ORDER — ASPIRIN 81 MG/1
81 TABLET ORAL DAILY
Status: DISCONTINUED | OUTPATIENT
Start: 2025-05-15 | End: 2025-05-16 | Stop reason: HOSPADM

## 2025-05-14 RX ORDER — GLUCAGON 1 MG
1 KIT INJECTION
Status: DISCONTINUED | OUTPATIENT
Start: 2025-05-14 | End: 2025-05-16 | Stop reason: HOSPADM

## 2025-05-14 RX ORDER — ALPRAZOLAM 0.5 MG/1
0.5 TABLET ORAL ONCE
Status: COMPLETED | OUTPATIENT
Start: 2025-05-14 | End: 2025-05-14

## 2025-05-14 RX ORDER — ALPRAZOLAM 0.5 MG/1
0.5 TABLET, EXTENDED RELEASE ORAL
COMMUNITY

## 2025-05-14 RX ORDER — ATORVASTATIN CALCIUM 40 MG/1
40 TABLET, FILM COATED ORAL DAILY
Status: DISCONTINUED | OUTPATIENT
Start: 2025-05-15 | End: 2025-05-16 | Stop reason: HOSPADM

## 2025-05-14 RX ORDER — PROMETHAZINE HYDROCHLORIDE 25 MG/1
25 TABLET ORAL EVERY 6 HOURS PRN
Status: DISCONTINUED | OUTPATIENT
Start: 2025-05-14 | End: 2025-05-16 | Stop reason: HOSPADM

## 2025-05-14 RX ORDER — SODIUM CHLORIDE 0.9 % (FLUSH) 0.9 %
3 SYRINGE (ML) INJECTION EVERY 6 HOURS PRN
Status: DISCONTINUED | OUTPATIENT
Start: 2025-05-14 | End: 2025-05-16 | Stop reason: HOSPADM

## 2025-05-14 RX ORDER — POLYETHYLENE GLYCOL 3350 17 G/17G
17 POWDER, FOR SOLUTION ORAL DAILY
Status: DISCONTINUED | OUTPATIENT
Start: 2025-05-15 | End: 2025-05-16 | Stop reason: HOSPADM

## 2025-05-14 RX ORDER — ACETAMINOPHEN 325 MG/1
650 TABLET ORAL EVERY 4 HOURS PRN
Status: DISCONTINUED | OUTPATIENT
Start: 2025-05-14 | End: 2025-05-16 | Stop reason: HOSPADM

## 2025-05-14 RX ORDER — ASCORBIC ACID 250 MG
1000 TABLET ORAL DAILY
Status: DISCONTINUED | OUTPATIENT
Start: 2025-05-15 | End: 2025-05-16 | Stop reason: HOSPADM

## 2025-05-14 RX ORDER — NALOXONE HCL 0.4 MG/ML
0.02 VIAL (ML) INJECTION
Status: DISCONTINUED | OUTPATIENT
Start: 2025-05-14 | End: 2025-05-16 | Stop reason: HOSPADM

## 2025-05-14 RX ORDER — ENOXAPARIN SODIUM 100 MG/ML
40 INJECTION SUBCUTANEOUS EVERY 24 HOURS
Status: DISCONTINUED | OUTPATIENT
Start: 2025-05-14 | End: 2025-05-16 | Stop reason: HOSPADM

## 2025-05-14 RX ORDER — ONDANSETRON HYDROCHLORIDE 2 MG/ML
4 INJECTION, SOLUTION INTRAVENOUS EVERY 8 HOURS PRN
Status: DISCONTINUED | OUTPATIENT
Start: 2025-05-14 | End: 2025-05-16 | Stop reason: HOSPADM

## 2025-05-14 RX ADMIN — ALPRAZOLAM 0.5 MG: 0.5 TABLET ORAL at 09:05

## 2025-05-14 RX ADMIN — METHYLPREDNISOLONE SODIUM SUCCINATE 1000 MG: 1 INJECTION, POWDER, LYOPHILIZED, FOR SOLUTION INTRAMUSCULAR; INTRAVENOUS at 04:05

## 2025-05-14 RX ADMIN — GADOBENATE DIMEGLUMINE 16 ML: 529 INJECTION, SOLUTION INTRAVENOUS at 02:05

## 2025-05-14 RX ADMIN — ENOXAPARIN SODIUM 40 MG: 40 INJECTION SUBCUTANEOUS at 06:05

## 2025-05-14 NOTE — CONSULTS
[facility] - Emergency Dept  Neurology  Consult Note    Patient Name: Refugio Peterson  MRN: [id number]  Admission Date: 5/14/2025  Hospital Length of Stay: 0 days  Code Status: No Order   Attending Provider: Carole Groves MD   Consulting Provider: ESTELITA Barcenas  Primary Care Physician: Glen Sam III, MD  Principal Problem:<principal problem not specified>    Inpatient Consult to Neurology Services (General Neurology)  Consult performed by: Imelda Joyner AGACNP-BC  Consult ordered by: Smita Rausch MD         Subjective:     Chief Complaint:       HPI:   69-year-old male with PMH CAD, MI, HTN, and cataract surgery (on 04/08 and 4/22) who was instructed to come to [facility] by his ophthalmologist on 05/14 due to b/l optic nerve swelling.  Reportedly had upper temporal vision loss to OD that began on 05/05, vision loss progressed, and now having vision loss to OS as well that began on 5/13.  Of note, patient is seen by his PCP o/p underwent CT head w/o (unable to view images or radiology report) and was started on prednisone 60 mg/d x5 days which he completed on 5/10, w/o significant improvement.    ESR/CRP WNL.  MRI brain/orbits w w/o feeling for acute infarct, abnormal enhancement or any other acute intracranial abnormalities per radiology report.       Past Medical History:   Diagnosis Date    CAD (coronary artery disease) 2010    w/stent    Cataracts, bilateral     GERD (gastroesophageal reflux disease)     Heart attack 2010     maker    HLD (hyperlipidemia)     Hypertension        Past Surgical History:   Procedure Laterality Date    COLONOSCOPY      x3    HERNIA REPAIR  1961    KNEE SURGERY Left 1991    PHACOEMULSIFICATION, CATARACT, WITH IOL INSERTION Right 4/8/2025    Procedure: PHACOEMULSIFICATION, CATARACT, WITH IOL INSERTION     //////right eye; Panoptix Toric; Catalys;  Surgeon: Ellie Reyna MD;  Location: [address];  Service: Ophthalmology;  Laterality:  Right;    PHACOEMULSIFICATION, CATARACT, WITH IOL INSERTION Left 4/22/2025    Procedure: PHACOEMULSIFICATION, CATARACT, WITH IOL INSERTION     //////left eye; Panoptix Toric; Catalys; Atropine; Diamox;  Surgeon: Ellie Reyna MD;  Location: [address];  Service: Ophthalmology;  Laterality: Left;       Review of patient's allergies indicates:  No Known Allergies    Current Neurological Medications:     No current facility-administered medications on file prior to encounter.     Current Outpatient Medications on File Prior to Encounter   Medication Sig    acetaminophen (TYLENOL) 650 MG TbSR Take 1,300 mg by mouth every 8 (eight) hours.    ascorbic acid, vitamin C, (VITAMIN C) 1000 MG tablet Take 1,000 mg by mouth once daily.    BABY ASPIRIN ORAL Take 81 mg by mouth every morning.    cycloSPORINE (RESTASIS) 0.05 % ophthalmic emulsion 1 drop 2 (two) times daily.    ezetimibe (ZETIA) 10 mg tablet     glucosam-chond-hrb 149-hyal ac (GLUCOS CHOND CPLX ADVANCED) 750 mg-100 mg- 125 mg-1.65 mg Tab     lisinopriL 10 MG tablet     multivitamin (ONE DAILY MULTIVITAMIN) per tablet Take 1 tablet by mouth once daily.    omeprazole (PRILOSEC) 20 MG capsule     prednisoLONE acetate (PRED MILD) 0.12 % ophthalmic suspension 1 drop 4 (four) times daily.    rosuvastatin (CRESTOR) 20 MG tablet Take 20 mg by mouth.    saw palmetto 500 MG capsule 2 (two) times daily.    vitamin D (VITAMIN D3) 1000 units Tab Take 1,000 Units by mouth once daily.    zinc gluconate 50 mg tablet Take 50 mg by mouth once daily.     Family History       Problem Relation (Age of Onset)    Asthma Mother    Cancer Father    Heart disease Mother          Tobacco Use    Smoking status: Never     Passive exposure: Never    Smokeless tobacco: Never   Substance and Sexual Activity    Alcohol use: Not Currently     Comment: Not in 50 years    Drug use: Never    Sexual activity: Not Currently     Partners: Female     Review of Systems  A 14pt ros was reviewed &  is negative unless o/w documented in the hpi    Objective:     Vital Signs (Most Recent):  Temp: 98.6 °F (37 °C) (05/14/25 1127)  Pulse: 76 (05/14/25 1507)  Resp: 15 (05/14/25 1507)  BP: (!) 157/93 (05/14/25 1507)  SpO2: 98 % (05/14/25 1507) Vital Signs (24h Range):  Temp:  [98.6 °F (37 °C)] 98.6 °F (37 °C)  Pulse:  [70-76] 76  Resp:  [14-18] 15  SpO2:  [98 %] 98 %  BP: (130-157)/(72-93) 157/93     Weight: 79.4 kg (175 lb)  Body mass index is 25.84 kg/m².     Physical Exam  Vitals reviewed.   Constitutional:       General: He is awake.      Appearance: Normal appearance.   HENT:      Head: Normocephalic and atraumatic.      Nose: Nose normal.      Mouth/Throat:      Mouth: Mucous membranes are moist.      Pharynx: Oropharynx is clear.   Eyes:      Extraocular Movements: EOM normal.      Pupils: Pupils are equal, round, and reactive to light.   Pulmonary:      Effort: Pulmonary effort is normal.   Musculoskeletal:         General: Normal range of motion.      Cervical back: Normal range of motion.   Skin:     General: Skin is warm and dry.   Neurological:      Mental Status: He is alert and oriented to person, place, and time.      Motor: Motor strength is normal.  Psychiatric:         Mood and Affect: Mood normal.         Speech: Speech normal.         Behavior: Behavior normal. Behavior is cooperative.         Thought Content: Thought content normal.          NEUROLOGICAL EXAMINATION:     MENTAL STATUS   Oriented to person, place, and time.   Follows 3 step commands.   Attention: normal. Concentration: normal.   Speech: speech is normal   Level of consciousness: alert  Knowledge: good.   Normal comprehension.     CRANIAL NERVES     CN II   Right visual field deficit: upper temporal and upper nasal (total upper vision loss, with decreased visual acuity to lower fields) quadrant(s)  Left visual field deficit: blurred vision.    CN III, IV, VI   Pupils are equal, round, and reactive to light.  Extraocular motions are  normal.   Nystagmus: none   Conjugate gaze: present    CN V   Facial sensation intact.     CN VII   Facial expression full, symmetric.     MOTOR EXAM   Muscle bulk: normal  Overall muscle tone: normal  Right arm pronator drift: absent  Left arm pronator drift: absent    Strength   Strength 5/5 throughout.     SENSORY EXAM   Light touch normal.       Significant Labs:   Recent Lab Results         05/14/25  1152   05/14/25  1151        Albumin/Globulin Ratio   1.4       Albumin   4.2       ALP   47       ALT   21       Anion Gap   9.0       AST   15       Baso # 0.01         Basophil % 0.2         BILIRUBIN TOTAL   0.4       BUN   15.5       BUN/CREAT RATIO   18       Calcium   9.4       Chloride   105       CO2   25       Creatinine   0.86       CRP   <1.00       eGFR   >60  Comment: Estimated GFR calculated using the CKD-EPI creatinine (2021) equation.       Eos # 0.20         Eos % 3.0         Globulin, Total   3.1       Glucose   100       Hematocrit 47.4         Hemoglobin 15.5         Immature Grans (Abs) 0.03         Immature Granulocytes 0.5         Lymph # 2.45         LYMPH % 37.0         MCH 29.8         MCHC 32.7         MCV 91.2         Mono # 0.76         Mono % 11.5         MPV 10.4         Neut # 3.18         Neut % 47.8         nRBC 0.0         Platelet Count 276         Potassium   4.1       PROTEIN TOTAL   7.3       RBC 5.20         RDW 13.3         Sed Rate 3         Sodium   139       WBC 6.63                 Significant Imaging:  MRI brain/orbits w w/o:  FINDINGS:  There is no restricted diffusion, hemorrhage or edema.  Mild scattered T2/FLAIR hyperintensities in the subcortical periventricular white matter likely represent chronic microvascular ischemic changes.  There is no abnormal parenchymal or leptomeningeal enhancement.     There is no mass effect or midline shift.  The basal cisterns are patent.  The ventricles are normal in size.  There is no abnormal extra-axial fluid collection.  The  major intracranial flow voids are patent.  The paranasal sinuses are clear     Impression:     1. No acute intracranial abnormality.  2. Mild chronic microvascular ischemic changes.       I have reviewed all pertinent imaging results/findings within the past 24 hours.  Assessment and Plan:     Acute loss of vision, bilateral  Painless, b/l, acute vision loss (R>L)  MRI brain/orbits w w/o negative for acute abnormalities or abnormal enhancement    -defer to MD for high dose steroids  -consideration for LP          VTE Risk Mitigation (From admission, onward)      None            Thank you for your consult. Further recommendations to follow per MD Imelda Joyner, LISHAFAITH-BC  Neurology  [facility] - Emergency Dept

## 2025-05-14 NOTE — H&P
[facility] Hospital Medicine History & Physical Examination       Patient Name: Refugio Peterson  MRN: [id number]  Patient Class: IP- Inpatient   Admission Date: 5/14/2025   Admitting Physician: WILMAR Service   Length of Stay: 0  Attending Physician: Carole Groves MD  Primary Care Provider: Glen Sam III, MD  Face-to-Face encounter date: 05/14/2025  Chief Complaint: Eye Problem (Hx cataracts Sx on April 8 and 22, 2025. Pt began having vision loss in R eye on May 8, 2025. Pt seen by Dr Reyna this AM, advised to come to ED D/T bilat optic nerve swelling. )      Screening for Social Drivers for health:  Patient screened for food insecurity, housing instability, transportation needs, utility difficulties, and interpersonal safety (select all that apply as identified as concern)  []Housing or Food  []Transportation Needs  []Utility Difficulties  []Interpersonal safety  [x]None      Patient information was obtained from patient, patient's family, past medical records and ER records.  ED records were reviewed in detail and documented below    HISTORY OF PRESENT ILLNESS:     69-year-old  male with significant history of CAD status post PCI 15 years back on baby aspirin, HTN, HLD, cataracts status post recent surgery on both sides in April, 2025.  Patient has started noticing right eye superior vision loss almost 10 days back.  Vision loss progressively worsened and he has been having symptoms on the left side for the past 1 day.  Patient reports he was treated by his PCP with oral prednisone for 5 days which he completed on 05/10.  Patient was evaluated by ophthalmologist today and had concerns for bilateral optic neuritis and therefore was sent to the ED for further evaluation.  Patient neurologically intact except for the vision loss.  Neurology team consulted and hospitalist team consulted for admission.  MRI brain negative.  MRI orbit with nonspecific focal enhancement  along the inferior margin of right optic nerve.          PAST MEDICAL HISTORY:     CAD   Acute MI   HTN  HLD   Cataract    PAST SURGICAL HISTORY:     Past Surgical History:   Procedure Laterality Date    COLONOSCOPY      x3    HERNIA REPAIR  1961    KNEE SURGERY Left 1991    PHACOEMULSIFICATION, CATARACT, WITH IOL INSERTION Right 4/8/2025    Procedure: PHACOEMULSIFICATION, CATARACT, WITH IOL INSERTION     //////right eye; Panoptix Toric; Catalys;  Surgeon: Ellie Reyna MD;  Location: [address] OR;  Service: Ophthalmology;  Laterality: Right;    PHACOEMULSIFICATION, CATARACT, WITH IOL INSERTION Left 4/22/2025    Procedure: PHACOEMULSIFICATION, CATARACT, WITH IOL INSERTION     //////left eye; Panoptix Toric; Catalys; Atropine; Diamox;  Surgeon: Ellie Reyna MD;  Location: [address] OR;  Service: Ophthalmology;  Laterality: Left;       ALLERGIES:   Patient has no known allergies.    FAMILY HISTORY:   Reviewed and negative    SOCIAL HISTORY:     Social History     Tobacco Use    Smoking status: Never     Passive exposure: Never    Smokeless tobacco: Never   Substance Use Topics    Alcohol use: Not Currently     Comment: Not in 50 years        HOME MEDICATIONS:     Prior to Admission medications    Medication Sig Start Date End Date Taking? Authorizing Provider   ALPRAZolam (XANAX XR) 0.5 MG Tb24 Take 0.5 mg by mouth as needed.   Yes Provider, Historical   acetaminophen (TYLENOL) 650 MG TbSR Take 1,300 mg by mouth nightly.    Provider, Historical   ascorbic acid, vitamin C, (VITAMIN C) 1000 MG tablet Take 1,000 mg by mouth once daily.   Yes Provider, Historical   BABY ASPIRIN ORAL Take 81 mg by mouth every morning.   Yes Provider, Historical   cycloSPORINE (RESTASIS) 0.05 % ophthalmic emulsion 1 drop 2 (two) times daily.   Yes Provider, Historical   ezetimibe (ZETIA) 10 mg tablet    Yes Provider, Historical   glucosam-chond-hrb 149-hyal ac (GLUCOS CHOND CPLX ADVANCED) 750 mg-100 mg- 125 mg-1.65 mg Tab     Yes Provider, Historical   lisinopriL 10 MG tablet    Yes Provider, Historical   multivitamin (ONE DAILY MULTIVITAMIN) per tablet Take 1 tablet by mouth once daily.   Yes Provider, Historical   omeprazole (PRILOSEC) 20 MG capsule    Yes Provider, Historical   prednisoLONE acetate (PRED MILD) 0.12 % ophthalmic suspension 1 drop 4 (four) times daily.   Yes Provider, Historical   rosuvastatin (CRESTOR) 20 MG tablet Take 20 mg by mouth. 1/27/25  Yes Provider, Historical   saw palmetto 500 MG capsule 2 (two) times daily.   Yes Provider, Historical   vitamin D (VITAMIN D3) 1000 units Tab Take 1,000 Units by mouth once daily.   Yes Provider, Historical   zinc gluconate 50 mg tablet Take 50 mg by mouth once daily.   Yes Provider, Historical       REVIEW OF SYSTEMS:   Except as documented, all other systems reviewed and negative     PHYSICAL EXAM:     VITAL SIGNS: 24 HRS MIN & MAX LAST   Temp  Min: 98.6 °F (37 °C)  Max: 98.6 °F (37 °C) 98.6 °F (37 °C)   BP  Min: 130/72  Max: 157/93 (!) 154/94   Pulse  Min: 70  Max: 80  80   Resp  Min: 13  Max: 18 13   SpO2  Min: 98 %  Max: 98 % 98 %     General appearance:  No acute distress  HENT: Atraumatic   Lungs: Clear to auscultation bilaterally.   Heart: RRR,No edema  Abdomen: Soft, non tender   Extremities: warm  Neuro:  Awake, alert, oriented x4  Psych/mental status: Appropriate mood and affect.      LABS AND IMAGING:     Recent Labs   Lab 05/14/25  1152   WBC 6.63   RBC 5.20   HGB 15.5   HCT 47.4   MCV 91.2   MCH 29.8   MCHC 32.7*   RDW 13.3      MPV 10.4       Recent Labs   Lab 05/14/25  1151      K 4.1      CO2 25   BUN 15.5   CREATININE 0.86      CALCIUM 9.4   ALBUMIN 4.2   PROT 7.3   ALKPHOS 47   ALT 21   AST 15   BILITOT 0.4       Microbiology Results (last 7 days)       ** No results found for the last 168 hours. **             MRI Orbits W W/O Contrast  Narrative: EXAMINATION:  MRI ORBITS W W/O CONTRAST    CLINICAL HISTORY:  Optic neuritis  suspected;    TECHNIQUE:  Multiplanar, multisequence MR images of the orbits were obtained with and without administration of intravenous contrast.    COMPARISON:  None    FINDINGS:  The globes are normal in contour.  There are changes of bilateral cataract surgery.  The optic nerves are symmetric in size and signal.  There is no evidence of optic nerve edema.  There is focal enhancement along the inferior margin of the right optic nerve (series 15, image 15).  There is no enhancing mass or inflammatory change within the orbits.  The extraocular muscles are symmetric.  The lacrimal glands are normal in appearance.  Meckel's caves and the cavernous sinuses are unremarkable.  Impression: 1. No evidence of optic nerve edema.  2. Nonspecific focal enhancement along the inferior margin of the right optic nerve, may be vascular.  3. Otherwise no acute abnormality of the orbits.    Electronically signed by: Jael Moran  Date:    05/14/2025  Time:    15:37  MRI Brain W WO Contrast  Narrative: EXAMINATION:  MRI BRAIN W WO CONTRAST    CLINICAL HISTORY:  Optic neuritis suspected;    TECHNIQUE:  Multiplanar, multisequence MR images of the brain were obtained with and without administration of intravenous contrast.    COMPARISON:  None    FINDINGS:  There is no restricted diffusion, hemorrhage or edema.  Mild scattered T2/FLAIR hyperintensities in the subcortical periventricular white matter likely represent chronic microvascular ischemic changes.  There is no abnormal parenchymal or leptomeningeal enhancement.    There is no mass effect or midline shift.  The basal cisterns are patent.  The ventricles are normal in size.  There is no abnormal extra-axial fluid collection.  The major intracranial flow voids are patent.  The paranasal sinuses are clear  Impression: 1. No acute intracranial abnormality.  2. Mild chronic microvascular ischemic changes.    Electronically signed by: Jael  Luisa  Date:    05/14/2025  Time:    15:28      ASSESSMENT & PLAN:     Suspected bilateral optic neuritis   Bilateral vision abnormality  Status post bilateral recent cataract surgery in April, 2025  History of CAD status post remote PCI   Essential HTN-slightly accelerated   HLD   Prophylaxis      Neurology on board   MRI brain is unremarkable   MRI orbit with nonspecific findings  Neurology has ordered autoimmune workup-interleukin-2, Ace, mi in oligodendrocytoma glycoprotein, BOZENA  Inflammatory markers are normal  Will also request Ophthalmology to continue to follow   Neurology has initiated him on high-dose IV Solu-Medrol-day 1/3  Labs stable   Resumed home meds-aspirin, statin, vitamin-C, Zetia, lisinopril, ppi, prednisolone eyedrops   DVT prophylaxis-subQ Lovenox    __________________________________________________________________________  INPATIENT LIST OF MEDICATIONS     Scheduled Meds:   methylPREDNISolone injection (PEDS and ADULTS)  1,000 mg Intravenous Daily     Continuous Infusions:  PRN Meds:.        If patient was admitted under observational status it is with my approval/permission.        All diagnosis and differential diagnosis have been reviewed; assessment and plan has been documented; I have personally reviewed the labs and test results that are presently available; I have reviewed the patients medication list; I have reviewed the consulting providers response and recommendations. I have reviewed or attempted to review medical records based upon their availability.    All of the patient and family questions have been addressed and answered. Patient's is agreeable to the above stated plan. I will continue to monitor closely and make adjustments to medical management as needed.    If patient was admitted under observational status it is with my approval/permission.      Carole Groves MD   05/14/2025

## 2025-05-14 NOTE — ED PROVIDER NOTES
Encounter Date: 5/14/2025    SCRIBE #1 NOTE: IStephanie am scribing for, and in the presence of,  Smita Rausch MD. I have scribed the following portions of the note - Other sections scribed: HPI, ROS, PE.       History     Chief Complaint   Patient presents with    Eye Problem     Hx cataracts Sx on April 8 and 22, 2025. Pt began having vision loss in R eye on May 8, 2025. Pt seen by Dr Reyna this AM, advised to come to ED D/T bilat optic nerve swelling.      Patient is a 69 year old male with a past medical history of CAD, cataract surgery x2, HTN, and MI presenting to the ED for evaluation of optic nerve swelling. Patient was seen by Dr. Reyna this morning, and he recommended he come to the ED for further evaluation. Patient had his first cataract surgery on 4/8/25, and the second on 4/22/25. He started having vision loss to the upper right side on May 8th. This continued to progress and he is now having vision loss in the left eye. Patient denies pain at this time. He had a CT done with his PCP and he was placed on oral prednisone. Patient denies headache, numbness, weakness, or gait disturbance     Hx cataracts Sx on April 8 and 22, 2025. Pt began having vision loss in R eye on May 8, 2025. Pt seen by Dr Reyna this AM, advised to come to ED D/T bilat optic nerve swelling.    The history is provided by the patient. No  was used.     Review of patient's allergies indicates:  No Known Allergies  Past Medical History:   Diagnosis Date    CAD (coronary artery disease) 2010    w/stent    Cataracts, bilateral     GERD (gastroesophageal reflux disease)     Heart attack 2010     maker    HLD (hyperlipidemia)     Hypertension      Past Surgical History:   Procedure Laterality Date    COLONOSCOPY      x3    HERNIA REPAIR  1961    KNEE SURGERY Left 1991    PHACOEMULSIFICATION, CATARACT, WITH IOL INSERTION Right 4/8/2025    Procedure: PHACOEMULSIFICATION, CATARACT, WITH IOL  INSERTION     //////right eye; Panoptix Toric; Catalys;  Surgeon: Ellie Reyna MD;  [address];  Service: Ophthalmology;  Laterality: Right;    PHACOEMULSIFICATION, CATARACT, WITH IOL INSERTION Left 4/22/2025    Procedure: PHACOEMULSIFICATION, CATARACT, WITH IOL INSERTION     //////left eye; Panoptix Toric; Catalys; Atropine; Diamox;  Surgeon: Ellie Reyna MD;  [address];  Service: Ophthalmology;  Laterality: Left;     Family History   Problem Relation Name Age of Onset    Asthma Mother Pinky Peterson     Heart disease Mother Pinky Peterson     Cancer Father Refugio Deng      Social History[1]  Review of Systems   Eyes:  Positive for visual disturbance.   Neurological:  Negative for weakness, numbness and headaches.        No gait disturbance       Physical Exam     Initial Vitals [05/14/25 1127]   BP Pulse Resp Temp SpO2   136/87 76 14 98.6 °F (37 °C) 98 %      MAP       --         Physical Exam    Nursing note and vitals reviewed.  Constitutional: He appears well-developed and well-nourished. No distress.   HENT:   Head: Normocephalic and atraumatic. Mouth/Throat: Oropharynx is clear and moist.   tongue protrudes in the midline   Eyes:   Pupils are pharmacologically dilated   Neck: Neck supple.   Normal range of motion.  Cardiovascular:  Normal rate and regular rhythm.           Pulmonary/Chest: No respiratory distress.   Abdominal: Abdomen is soft. Bowel sounds are normal. He exhibits no distension. There is no abdominal tenderness.   Musculoskeletal:         General: Normal range of motion.      Cervical back: Normal range of motion and neck supple.      Lumbar back: Normal. No tenderness. Normal range of motion.     Neurological: He is alert and oriented to person, place, and time.   5/5 strength to bilateral upper and lower extremities  Tongue protrudes midline, symmetric smile and forehead wrinkle, fluent speech, sensation grossly intact to all dermatomal  distributions of the face, arms and legs.   Skin: Skin is warm and dry.         ED Course   Procedures  Labs Reviewed   CBC WITH DIFFERENTIAL - Abnormal       Result Value    WBC 6.63      RBC 5.20      Hgb 15.5      Hct 47.4      MCV 91.2      MCH 29.8      MCHC 32.7 (*)     RDW 13.3      Platelet 276      MPV 10.4      Neut % 47.8      Lymph % 37.0      Mono % 11.5      Eos % 3.0      Basophil % 0.2      Imm Grans % 0.5      Neut # 3.18      Lymph # 2.45      Mono # 0.76      Eos # 0.20      Baso # 0.01      Imm Gran # 0.03      NRBC% 0.0     SEDIMENTATION RATE, AUTOMATED - Normal    Sed Rate 3     C-REACTIVE PROTEIN - Normal    CRP <1.00     CBC W/ AUTO DIFFERENTIAL    Narrative:     The following orders were created for panel order CBC auto differential.  Procedure                               Abnormality         Status                     ---------                               -----------         ------                     CBC with Differential[[id number]]       Abnormal            Final result                 Please view results for these tests on the individual orders.   COMPREHENSIVE METABOLIC PANEL    Sodium 139      Potassium 4.1      Chloride 105      CO2 25      Glucose 100      Blood Urea Nitrogen 15.5      Creatinine 0.86      Calcium 9.4      Protein Total 7.3      Albumin 4.2      Globulin 3.1      Albumin/Globulin Ratio 1.4      Bilirubin Total 0.4      ALP 47      ALT 21      AST 15      eGFR >60      Anion Gap 9.0      BUN/Creatinine Ratio 18            Imaging Results              MRI Orbits W W/O Contrast (Final result)  Result time 05/14/25 15:37:52      Final result by Jael Moran MD (05/14/25 15:37:52)                   Impression:      1. No evidence of optic nerve edema.  2. Nonspecific focal enhancement along the inferior margin of the right optic nerve, may be vascular.  3. Otherwise no acute abnormality of the orbits.      Electronically signed by: Jael  Luisa  Date:    05/14/2025  Time:    15:37               Narrative:    EXAMINATION:  MRI ORBITS W W/O CONTRAST    CLINICAL HISTORY:  Optic neuritis suspected;    TECHNIQUE:  Multiplanar, multisequence MR images of the orbits were obtained with and without administration of intravenous contrast.    COMPARISON:  None    FINDINGS:  The globes are normal in contour.  There are changes of bilateral cataract surgery.  The optic nerves are symmetric in size and signal.  There is no evidence of optic nerve edema.  There is focal enhancement along the inferior margin of the right optic nerve (series 15, image 15).  There is no enhancing mass or inflammatory change within the orbits.  The extraocular muscles are symmetric.  The lacrimal glands are normal in appearance.  Meckel's caves and the cavernous sinuses are unremarkable.                                       MRI Brain W WO Contrast (Final result)  Result time 05/14/25 15:28:21      Final result by Jael Moran MD (05/14/25 15:28:21)                   Impression:      1. No acute intracranial abnormality.  2. Mild chronic microvascular ischemic changes.      Electronically signed by: Jael Moran  Date:    05/14/2025  Time:    15:28               Narrative:    EXAMINATION:  MRI BRAIN W WO CONTRAST    CLINICAL HISTORY:  Optic neuritis suspected;    TECHNIQUE:  Multiplanar, multisequence MR images of the brain were obtained with and without administration of intravenous contrast.    COMPARISON:  None    FINDINGS:  There is no restricted diffusion, hemorrhage or edema.  Mild scattered T2/FLAIR hyperintensities in the subcortical periventricular white matter likely represent chronic microvascular ischemic changes.  There is no abnormal parenchymal or leptomeningeal enhancement.    There is no mass effect or midline shift.  The basal cisterns are patent.  The ventricles are normal in size.  There is no abnormal extra-axial fluid collection.  The major  intracranial flow voids are patent.  The paranasal sinuses are clear                                       Medications - No data to display  Medical Decision Making  Amount and/or Complexity of Data Reviewed  Labs: ordered.  Radiology: ordered.    Risk  Decision regarding hospitalization.      ED assessment:    Mr. Peterson was directed to the emergency department by Ophthalmology with plan for MRI of the brain and orbits, neurology consult and admission with concern for acute vision changes with optic nerve edema noted on her examination.  No other focal neurologic deficits on bedside exam.    Differential diagnosis (including but not limited to):   Optic neuritis, CVA, increased intracranial pressure, vasculitis, postsurgical change    ED management:   Laboratory studies with no elevation of inflammatory markers, MRIs pending at time of admission.  Case discussed with hospital medicine, inpatient Neurology and Ophthalmology consultations ordered.      Amount and/or Complexity of Data Reviewed  Independent historian: none   Summary of history:   External data reviewed: previous procedure and OR notes  Summary of data reviewed:  Underwent cataract surgeries on 04/08 and 425 with Dr. Ellie Reyna.  Seen and follow up with concern for ischemic neuropathy.  Risk and benefits of testing: discussed   Labs: ordered and reviewed  Radiology: ordered and reviewed  Discussion of management or test interpretation with external provider(s): discussed with hospitalist physician and discussed with Ophthalmology consultant   Summary of discussion:  As above    Risk  Decision regarding hospitalization  Shared decision making     Critical Care  none    I, Smita Rausch MD personally performed the history, PE, MDM, and procedures as documented above and agree with the scribe's documentation.           Scribe Attestation:   Scribe #1: I performed the above scribed service and the documentation accurately describes the services I  performed. I attest to the accuracy of the note.    Attending Attestation:           Physician Attestation for Scribe:  Physician Attestation Statement for Scribe #1: I, Smita Rausch MD, reviewed documentation, as scribed by Stephanie Santiago in my presence, and it is both accurate and complete.             ED Course as of 05/14/25 1303   Wed May 14, 2025   1250 Paged hospitalist [AB]      ED Course User Index  [AB] Stephanie Santiago                           Clinical Impression:  Final diagnoses:  [H46.9] Optic neuritis  [Z98.49] Status post cataract extraction, unspecified laterality          ED Disposition Condition    Admit                     [1]   Social History  Tobacco Use    Smoking status: Never     Passive exposure: Never    Smokeless tobacco: Never   Substance Use Topics    Alcohol use: Not Currently     Comment: Not in 50 years    Drug use: Never        Smita Rausch MD  05/16/25 4232

## 2025-05-14 NOTE — PLAN OF CARE
Pt is , 4 children, no living will or POA.    05/14/25 1453   Discharge Assessment   Assessment Type Discharge Planning Assessment   Confirmed/corrected address, phone number and insurance Yes   Confirmed Demographics Correct on Facesheet   Source of Information family   When was your last doctors appointment?   (PCP [facility])   Communicated DORA with patient/caregiver Date not available/Unable to determine   People in Home spouse;child(mahendra), adult   Do you expect to return to your current living situation? Yes   Do you have help at home or someone to help you manage your care at home? Yes   Who are your caregiver(s) and their phone number(s)? wife Yovani Peterson [phone]   Prior to hospitilization cognitive status: Unable to Assess   Current cognitive status: Unable to Assess   Walking or Climbing Stairs Difficulty no   Dressing/Bathing Difficulty no   Home Accessibility stairs to enter home   Number of Stairs, Main Entrance five   Home Layout Able to live on 1st floor   Equipment Currently Used at Home none   Readmission within 30 days? No   Patient currently being followed by outpatient case management? No   Do you currently have service(s) that help you manage your care at home? No   Do you take prescription medications? Yes  (fills with [facility] pharmacy)   Do you have prescription coverage? Yes  (Medicare)   Do you have any problems affording any of your prescribed medications? TBD   Is the patient taking medications as prescribed? yes   Who is going to help you get home at discharge? wife   How do you get to doctors appointments? car, drives self;family or friend will provide   Are you on dialysis? No   Do you take coumadin? No   Discharge Plan A Other  (TBD)   DME Needed Upon Discharge  other (see comments)  (TBD)   Discharge Plan discussed with: Spouse/sig other   Name(s) and Number(s) Yovani Peterson [phone]   Transition of Care Barriers None   Physical Activity   On average, how many days per  week do you engage in moderate to strenuous exercise (like a brisk walk)? 3 days   On average, how many minutes do you engage in exercise at this level? 40 min   Financial Resource Strain   How hard is it for you to pay for the very basics like food, housing, medical care, and heating? Not very   Housing Stability   In the last 12 months, was there a time when you were not able to pay the mortgage or rent on time? N   At any time in the past 12 months, were you homeless or living in a shelter (including now)? N   Transportation Needs   In the past 12 months, has lack of transportation kept you from medical appointments or from getting medications? no   In the past 12 months, has lack of transportation kept you from meetings, work, or from getting things needed for daily living? No   Food Insecurity   Within the past 12 months, you worried that your food would run out before you got the money to buy more. Never true   Within the past 12 months, the food you bought just didn't last and you didn't have money to get more. Never true   Stress   Do you feel stress - tense, restless, nervous, or anxious, or unable to sleep at night because your mind is troubled all the time - these days? Pt Unable   Social Isolation   How often do you feel lonely or isolated from those around you?  Patient unable to answer   Alcohol Use   Q1: How often do you have a drink containing alcohol? Never   Q2: How many drinks containing alcohol do you have on a typical day when you are drinking? None   Q3: How often do you have six or more drinks on one occasion? Never   Utilities   In the past 12 months has the electric, gas, oil, or water company threatened to shut off services in your home? No   Health Literacy   How often do you need to have someone help you when you read instructions, pamphlets, or other written material from your doctor or pharmacy? Rarely   OTHER   Name(s) of People in Home wife Yovani and adult son

## 2025-05-14 NOTE — PLAN OF CARE
Problem: Adult Inpatient Plan of Care  Goal: Plan of Care Review  Outcome: Progressing  Flowsheets (Taken 5/14/2025 1835)  Plan of Care Reviewed With: patient  Goal: Patient-Specific Goal (Individualized)  Outcome: Progressing  Goal: Absence of Hospital-Acquired Illness or Injury  Outcome: Progressing  Intervention: Identify and Manage Fall Risk  Flowsheets (Taken 5/14/2025 1835)  Safety Promotion/Fall Prevention:   assistive device/personal item within reach   Fall Risk signage in place   medications reviewed   side rails raised x 3  Intervention: Prevent Skin Injury  Flowsheets (Taken 5/14/2025 1835)  Body Position: position changed independently  Skin Protection: transparent dressing maintained  Intervention: Prevent and Manage VTE (Venous Thromboembolism) Risk  Flowsheets (Taken 5/14/2025 1835)  VTE Prevention/Management:   bleeding risk assessed   bleeding precautions maintained   ambulation promoted   fluids promoted  Intervention: Prevent Infection  Flowsheets (Taken 5/14/2025 1835)  Infection Prevention:   hand hygiene promoted   rest/sleep promoted  Goal: Optimal Comfort and Wellbeing  Outcome: Progressing  Intervention: Monitor Pain and Promote Comfort  Flowsheets (Taken 5/14/2025 1835)  Pain Management Interventions:   medication offered   quiet environment facilitated   relaxation techniques promoted  Intervention: Provide Person-Centered Care  Flowsheets (Taken 5/14/2025 1835)  Trust Relationship/Rapport:   care explained   thoughts/feelings acknowledged  Goal: Readiness for Transition of Care  Outcome: Progressing     Problem: Wound  Goal: Optimal Coping  Outcome: Progressing  Intervention: Support Patient and Family Response  Flowsheets (Taken 5/14/2025 1835)  Supportive Measures:   active listening utilized   verbalization of feelings encouraged   self-care encouraged  Family/Support System Care: presence promoted  Goal: Optimal Functional Ability  Outcome: Progressing  Intervention: Optimize  Functional Ability  Flowsheets (Taken 5/14/2025 1835)  Activity Management:   Ambulated in room - L4   Sitting at edge of bed - L2  Activity Assistance Provided: independent  Goal: Absence of Infection Signs and Symptoms  Outcome: Progressing  Intervention: Prevent or Manage Infection  Flowsheets (Taken 5/14/2025 1835)  Infection Management: aseptic technique maintained  Isolation Precautions: precautions maintained  Goal: Improved Oral Intake  Outcome: Progressing  Goal: Optimal Pain Control and Function  Outcome: Progressing  Intervention: Prevent or Manage Pain  Flowsheets (Taken 5/14/2025 1835)  Sleep/Rest Enhancement:   relaxation techniques promoted   regular sleep/rest pattern promoted  Pain Management Interventions:   medication offered   quiet environment facilitated   relaxation techniques promoted  Goal: Skin Health and Integrity  Outcome: Progressing  Intervention: Optimize Skin Protection  Flowsheets (Taken 5/14/2025 1835)  Pressure Reduction Techniques: frequent weight shift encouraged  Skin Protection: transparent dressing maintained  Activity Management:   Ambulated in room - L4   Sitting at edge of bed - L2  Head of Bed (HOB) Positioning: HOB flat  Goal: Optimal Wound Healing  Outcome: Progressing  Intervention: Promote Wound Healing  Flowsheets (Taken 5/14/2025 1835)  Sleep/Rest Enhancement:   relaxation techniques promoted   regular sleep/rest pattern promoted

## 2025-05-14 NOTE — ASSESSMENT & PLAN NOTE
Painless, b/l, acute vision loss (R>L)  MRI brain/orbits w w/o negative for acute abnormalities or abnormal enhancement    -defer to MD for high dose steroids  -consideration for LP

## 2025-05-14 NOTE — HPI
69-year-old male with PMH CAD, MI, HTN, and cataract surgery (on 04/08 and 4/22) who was instructed to come to [facility] by his ophthalmologist on 05/14 due to b/l optic nerve swelling.  Reportedly had upper temporal vision loss to OD that began on 05/05, vision loss progressed, and now having vision loss to OS as well that began on 5/13.  Of note, patient is seen by his PCP o/p underwent CT head w/o (unable to view images or radiology report) and was started on prednisone 60 mg/d x5 days which he completed on 5/10, w/o significant improvement.    ESR/CRP WNL.  MRI brain/orbits w w/o feeling for acute infarct, abnormal enhancement or any other acute intracranial abnormalities per radiology report.

## 2025-05-14 NOTE — SUBJECTIVE & OBJECTIVE
Past Medical History:   Diagnosis Date    CAD (coronary artery disease) 2010    w/stent    Cataracts, bilateral     GERD (gastroesophageal reflux disease)     Heart attack 2010     maker    HLD (hyperlipidemia)     Hypertension        Past Surgical History:   Procedure Laterality Date    COLONOSCOPY      x3    HERNIA REPAIR  1961    KNEE SURGERY Left 1991    PHACOEMULSIFICATION, CATARACT, WITH IOL INSERTION Right 4/8/2025    Procedure: PHACOEMULSIFICATION, CATARACT, WITH IOL INSERTION     //////right eye; Panoptix Toric; Catalys;  Surgeon: Ellie Reyna MD;  Location: [address] OR;  Service: Ophthalmology;  Laterality: Right;    PHACOEMULSIFICATION, CATARACT, WITH IOL INSERTION Left 4/22/2025    Procedure: PHACOEMULSIFICATION, CATARACT, WITH IOL INSERTION     //////left eye; Panoptix Toric; Catalys; Atropine; Diamox;  Surgeon: Ellie Reyna MD;  Location: [address] OR;  Service: Ophthalmology;  Laterality: Left;       Review of patient's allergies indicates:  No Known Allergies    Current Neurological Medications:     No current facility-administered medications on file prior to encounter.     Current Outpatient Medications on File Prior to Encounter   Medication Sig    acetaminophen (TYLENOL) 650 MG TbSR Take 1,300 mg by mouth every 8 (eight) hours.    ascorbic acid, vitamin C, (VITAMIN C) 1000 MG tablet Take 1,000 mg by mouth once daily.    BABY ASPIRIN ORAL Take 81 mg by mouth every morning.    cycloSPORINE (RESTASIS) 0.05 % ophthalmic emulsion 1 drop 2 (two) times daily.    ezetimibe (ZETIA) 10 mg tablet     glucosam-chond-hrb 149-hyal ac (GLUCOS CHOND CPLX ADVANCED) 750 mg-100 mg- 125 mg-1.65 mg Tab     lisinopriL 10 MG tablet     multivitamin (ONE DAILY MULTIVITAMIN) per tablet Take 1 tablet by mouth once daily.    omeprazole (PRILOSEC) 20 MG capsule     prednisoLONE acetate (PRED MILD) 0.12 % ophthalmic suspension 1 drop 4 (four) times daily.    rosuvastatin (CRESTOR) 20 MG tablet Take 20  mg by mouth.    saw palmetto 500 MG capsule 2 (two) times daily.    vitamin D (VITAMIN D3) 1000 units Tab Take 1,000 Units by mouth once daily.    zinc gluconate 50 mg tablet Take 50 mg by mouth once daily.     Family History       Problem Relation (Age of Onset)    Asthma Mother    Cancer Father    Heart disease Mother          Tobacco Use    Smoking status: Never     Passive exposure: Never    Smokeless tobacco: Never   Substance and Sexual Activity    Alcohol use: Not Currently     Comment: Not in 50 years    Drug use: Never    Sexual activity: Not Currently     Partners: Female     Review of Systems  A 14pt ros was reviewed & is negative unless o/w documented in the hpi    Objective:     Vital Signs (Most Recent):  Temp: 98.6 °F (37 °C) (05/14/25 1127)  Pulse: 76 (05/14/25 1507)  Resp: 15 (05/14/25 1507)  BP: (!) 157/93 (05/14/25 1507)  SpO2: 98 % (05/14/25 1507) Vital Signs (24h Range):  Temp:  [98.6 °F (37 °C)] 98.6 °F (37 °C)  Pulse:  [70-76] 76  Resp:  [14-18] 15  SpO2:  [98 %] 98 %  BP: (130-157)/(72-93) 157/93     Weight: 79.4 kg (175 lb)  Body mass index is 25.84 kg/m².     Physical Exam  Vitals reviewed.   Constitutional:       General: He is awake.      Appearance: Normal appearance.   HENT:      Head: Normocephalic and atraumatic.      Nose: Nose normal.      Mouth/Throat:      Mouth: Mucous membranes are moist.      Pharynx: Oropharynx is clear.   Eyes:      Extraocular Movements: EOM normal.      Pupils: Pupils are equal, round, and reactive to light.   Pulmonary:      Effort: Pulmonary effort is normal.   Musculoskeletal:         General: Normal range of motion.      Cervical back: Normal range of motion.   Skin:     General: Skin is warm and dry.   Neurological:      Mental Status: He is alert and oriented to person, place, and time.      Motor: Motor strength is normal.  Psychiatric:         Mood and Affect: Mood normal.         Speech: Speech normal.         Behavior: Behavior normal. Behavior is  cooperative.         Thought Content: Thought content normal.          NEUROLOGICAL EXAMINATION:     MENTAL STATUS   Oriented to person, place, and time.   Follows 3 step commands.   Attention: normal. Concentration: normal.   Speech: speech is normal   Level of consciousness: alert  Knowledge: good.   Normal comprehension.     CRANIAL NERVES     CN II   Right visual field deficit: upper temporal and upper nasal (total upper vision loss, with decreased visual acuity to lower fields) quadrant(s)  Left visual field deficit: blurred vision.    CN III, IV, VI   Pupils are equal, round, and reactive to light.  Extraocular motions are normal.   Nystagmus: none   Conjugate gaze: present    CN V   Facial sensation intact.     CN VII   Facial expression full, symmetric.     MOTOR EXAM   Muscle bulk: normal  Overall muscle tone: normal  Right arm pronator drift: absent  Left arm pronator drift: absent    Strength   Strength 5/5 throughout.     SENSORY EXAM   Light touch normal.       Significant Labs:   Recent Lab Results         05/14/25  1152   05/14/25  1151        Albumin/Globulin Ratio   1.4       Albumin   4.2       ALP   47       ALT   21       Anion Gap   9.0       AST   15       Baso # 0.01         Basophil % 0.2         BILIRUBIN TOTAL   0.4       BUN   15.5       BUN/CREAT RATIO   18       Calcium   9.4       Chloride   105       CO2   25       Creatinine   0.86       CRP   <1.00       eGFR   >60  Comment: Estimated GFR calculated using the CKD-EPI creatinine (2021) equation.       Eos # 0.20         Eos % 3.0         Globulin, Total   3.1       Glucose   100       Hematocrit 47.4         Hemoglobin 15.5         Immature Grans (Abs) 0.03         Immature Granulocytes 0.5         Lymph # 2.45         LYMPH % 37.0         MCH 29.8         MCHC 32.7         MCV 91.2         Mono # 0.76         Mono % 11.5         MPV 10.4         Neut # 3.18         Neut % 47.8         nRBC 0.0         Platelet Count 276          Potassium   4.1       PROTEIN TOTAL   7.3       RBC 5.20         RDW 13.3         Sed Rate 3         Sodium   139       WBC 6.63                 Significant Imaging:  MRI brain/orbits w w/o:  FINDINGS:  There is no restricted diffusion, hemorrhage or edema.  Mild scattered T2/FLAIR hyperintensities in the subcortical periventricular white matter likely represent chronic microvascular ischemic changes.  There is no abnormal parenchymal or leptomeningeal enhancement.     There is no mass effect or midline shift.  The basal cisterns are patent.  The ventricles are normal in size.  There is no abnormal extra-axial fluid collection.  The major intracranial flow voids are patent.  The paranasal sinuses are clear     Impression:     1. No acute intracranial abnormality.  2. Mild chronic microvascular ischemic changes.       I have reviewed all pertinent imaging results/findings within the past 24 hours.

## 2025-05-15 LAB
CREAT SERPL-MCNC: 0.89 MG/DL (ref 0.72–1.25)
GFR SERPLBLD CREATININE-BSD FMLA CKD-EPI: >60 ML/MIN/1.73/M2
RHEUMATOID FACT SERPL-ACNC: <13 IU/ML
T PALLIDUM AB SER QL: NONREACTIVE

## 2025-05-15 PROCEDURE — 36415 COLL VENOUS BLD VENIPUNCTURE: CPT | Performed by: OPHTHALMOLOGY

## 2025-05-15 PROCEDURE — 86431 RHEUMATOID FACTOR QUANT: CPT | Performed by: OPHTHALMOLOGY

## 2025-05-15 PROCEDURE — 86780 TREPONEMA PALLIDUM: CPT | Performed by: OPHTHALMOLOGY

## 2025-05-15 PROCEDURE — 11000001 HC ACUTE MED/SURG PRIVATE ROOM

## 2025-05-15 PROCEDURE — 86235 NUCLEAR ANTIGEN ANTIBODY: CPT | Performed by: OPHTHALMOLOGY

## 2025-05-15 PROCEDURE — 25000003 PHARM REV CODE 250: Performed by: NURSE PRACTITIONER

## 2025-05-15 PROCEDURE — 63600175 PHARM REV CODE 636 W HCPCS: Mod: JZ,TB | Performed by: STUDENT IN AN ORGANIZED HEALTH CARE EDUCATION/TRAINING PROGRAM

## 2025-05-15 PROCEDURE — 25000003 PHARM REV CODE 250: Performed by: STUDENT IN AN ORGANIZED HEALTH CARE EDUCATION/TRAINING PROGRAM

## 2025-05-15 PROCEDURE — 25000003 PHARM REV CODE 250: Performed by: INTERNAL MEDICINE

## 2025-05-15 PROCEDURE — 86053 AQAPRN-4 ANTB FLO CYTMTRY EA: CPT | Performed by: OPHTHALMOLOGY

## 2025-05-15 PROCEDURE — 82565 ASSAY OF CREATININE: CPT | Performed by: OPHTHALMOLOGY

## 2025-05-15 RX ORDER — ALPRAZOLAM 0.5 MG/1
0.5 TABLET ORAL NIGHTLY PRN
Status: DISCONTINUED | OUTPATIENT
Start: 2025-05-15 | End: 2025-05-16 | Stop reason: HOSPADM

## 2025-05-15 RX ORDER — CETIRIZINE HYDROCHLORIDE 10 MG/1
10 TABLET ORAL DAILY PRN
Status: DISCONTINUED | OUTPATIENT
Start: 2025-05-15 | End: 2025-05-16 | Stop reason: HOSPADM

## 2025-05-15 RX ADMIN — Medication 1000 UNITS: at 09:05

## 2025-05-15 RX ADMIN — PANTOPRAZOLE SODIUM 40 MG: 40 TABLET, DELAYED RELEASE ORAL at 09:05

## 2025-05-15 RX ADMIN — ALPRAZOLAM 0.5 MG: 0.5 TABLET ORAL at 09:05

## 2025-05-15 RX ADMIN — CETIRIZINE HYDROCHLORIDE 10 MG: 10 TABLET, FILM COATED ORAL at 10:05

## 2025-05-15 RX ADMIN — LISINOPRIL 10 MG: 10 TABLET ORAL at 09:05

## 2025-05-15 RX ADMIN — POLYETHYLENE GLYCOL 3350 17 G: 17 POWDER, FOR SOLUTION ORAL at 09:05

## 2025-05-15 RX ADMIN — Medication 1000 MG: at 09:05

## 2025-05-15 RX ADMIN — ASPIRIN 81 MG: 81 TABLET, COATED ORAL at 09:05

## 2025-05-15 RX ADMIN — ATORVASTATIN CALCIUM 40 MG: 40 TABLET, FILM COATED ORAL at 09:05

## 2025-05-15 RX ADMIN — METHYLPREDNISOLONE SODIUM SUCCINATE 1000 MG: 1 INJECTION, POWDER, LYOPHILIZED, FOR SOLUTION INTRAMUSCULAR; INTRAVENOUS at 10:05

## 2025-05-15 RX ADMIN — EZETIMIBE 10 MG: 10 TABLET ORAL at 09:05

## 2025-05-15 NOTE — PROGRESS NOTES
[facility]  Neurology  Progress Note    Patient Name: Refugio Peterson  MRN: [id number]  Admission Date: 5/14/2025  Hospital Length of Stay: 1 days  Code Status: Full Code   Attending Provider: Carole Groves MD  Primary Care Physician: Glen Sam III, MD   Principal Problem:<principal problem not specified>    HPI:   69-year-old male with PMH CAD, MI, HTN, and cataract surgery (on 04/08 and 4/22) who was instructed to come to [facility] by his ophthalmologist on 05/14 due to b/l optic nerve swelling.  Reportedly had upper temporal vision loss to OD that began on 05/05, vision loss progressed, and now having vision loss to OS as well that began on 5/13.  Of note, patient is seen by his PCP o/p underwent CT head w/o (unable to view images or radiology report) and was started on prednisone 60 mg/d x5 days which he completed on 5/10, w/o significant improvement.    ESR/CRP WNL.  MRI brain/orbits w w/o feeling for acute infarct, abnormal enhancement or any other acute intracranial abnormalities per radiology report.      Overview/Hospital Course:  No notes on file        Subjective:     Interval History:   Visual symptoms unchanged.  Started on high-dose IV steroids yesterday evening, tolerated well.    Mri orbits w w/o revealed some focal enhancement along inferior R optic nerve.   Results d/w pt and pt's wife at bedside per Dr. Bruce.  Current Neurological Medications:     Current Medications[1]    Review of Systems  A 14pt ros was reviewed & is negative unless o/w documented in the hpi    Objective:     Vital Signs (Most Recent):  Temp: 98.4 °F (36.9 °C) (05/15/25 0810)  Pulse: 90 (05/15/25 0810)  Resp: 19 (05/15/25 0810)  BP: (!) 139/90 (05/15/25 0810)  SpO2: 97 % (05/15/25 0810) Vital Signs (24h Range):  Temp:  [97.5 °F (36.4 °C)-98.6 °F (37 °C)] 98.4 °F (36.9 °C)  Pulse:  [70-92] 90  Resp:  [13-19] 19  SpO2:  [94 %-98 %] 97 %  BP: (125-157)/(72-94) 139/90     Weight: 79.4 kg (175  lb)  Body mass index is 25.84 kg/m².     Physical Exam   Vitals reviewed.   Constitutional:       General: He is awake.      Appearance: Normal appearance.   HENT:      Head: Normocephalic and atraumatic.      Nose: Nose normal.      Mouth/Throat:      Mouth: Mucous membranes are moist.      Pharynx: Oropharynx is clear.   Eyes:      Extraocular Movements: EOM normal.      Pupils: Pupils are equal, round, and reactive to light.   Pulmonary:      Effort: Pulmonary effort is normal.   Musculoskeletal:         General: Normal range of motion.      Cervical back: Normal range of motion.   Skin:     General: Skin is warm and dry.   Neurological:      Mental Status: He is alert and oriented to person, place, and time.      Motor: Motor strength is normal.  Psychiatric:         Mood and Affect: Mood normal.         Speech: Speech normal.         Behavior: Behavior normal. Behavior is cooperative.         Thought Content: Thought content normal.         NEUROLOGICAL EXAMINATION:      MENTAL STATUS   Oriented to person, place, and time.   Follows 3 step commands.   Attention: normal. Concentration: normal.   Speech: speech is normal   Level of consciousness: alert  Knowledge: good.   Normal comprehension.      CRANIAL NERVES      CN II   Right visual field deficit: upper temporal and upper nasal (total upper vision loss, with decreased visual acuity to lower fields) quadrant(s)  Left visual field deficit: blurred vision.     CN III, IV, VI   Pupils are equal, round, and reactive to light.  Extraocular motions are normal.   Nystagmus: none   Conjugate gaze: present     CN V   Facial sensation intact.      CN VII   Facial expression full, symmetric.      MOTOR EXAM   Muscle bulk: normal  Overall muscle tone: normal  Right arm pronator drift: absent  Left arm pronator drift: absent     Strength   Strength 5/5 throughout.      SENSORY EXAM   Light touch normal.           Significant Labs:   Recent Lab Results          05/14/25  1152   05/14/25  1151        Albumin/Globulin Ratio   1.4       Albumin   4.2       ALP   47       ALT   21       Anion Gap   9.0       AST   15       Baso # 0.01         Basophil % 0.2         BILIRUBIN TOTAL   0.4       BUN   15.5       BUN/CREAT RATIO   18       Calcium   9.4       Chloride   105       CO2   25       Creatinine   0.86       CRP   <1.00       eGFR   >60  Comment: Estimated GFR calculated using the CKD-EPI creatinine (2021) equation.       Eos # 0.20         Eos % 3.0         Globulin, Total   3.1       Glucose   100       Hematocrit 47.4         Hemoglobin 15.5         Immature Grans (Abs) 0.03         Immature Granulocytes 0.5         Lymph # 2.45         LYMPH % 37.0         MCH 29.8         MCHC 32.7         MCV 91.2         Mono # 0.76         Mono % 11.5         MPV 10.4         Neut # 3.18         Neut % 47.8         nRBC 0.0         Platelet Count 276         Potassium   4.1       PROTEIN TOTAL   7.3       RBC 5.20         RDW 13.3         Sed Rate 3         Sodium   139       WBC 6.63                 Significant Imaging:   MRI orbits w w/o:  Impression:     1. No evidence of optic nerve edema.  2. Nonspecific focal enhancement along the inferior margin of the right optic nerve, may be vascular.  3. Otherwise no acute abnormality of the orbits.       I have reviewed all pertinent imaging results/findings within the past 24 hours.    Assessment and Plan:     Acute loss of vision, bilateral  Painless, b/l, acute vision loss (R>L)  MRI orbits w w/o: focal enhancement along the inferior margin of the right optic nerve     -on IVMP 1 gm daily x3 doses  -MRA brain/neck and MRV brain pending  -ophthalmology following  -Further recommendations per MD            VTE Risk Mitigation (From admission, onward)           Ordered     enoxaparin injection 40 mg  Daily         05/14/25 1726     IP VTE HIGH RISK PATIENT  Once         05/14/25 1726     Place sequential compression device  Until  discontinued         05/14/25 1726                    [facility]elda [facility]  Neurology  [facility]       [1]   Current Facility-Administered Medications   Medication Dose Route Frequency Provider Last Rate Last Admin    acetaminophen tablet 650 mg  650 mg Oral Q8H PRN Carole Groves MD        acetaminophen tablet 650 mg  650 mg Oral Q4H PRN Carole Groves MD        ascorbic acid (vitamin C) tablet 1,000 mg  1,000 mg Oral Daily Carole Groves MD   1,000 mg at 05/15/25 0906    aspirin EC tablet 81 mg  81 mg Oral Daily Carole Groves MD   81 mg at 05/15/25 0906    atorvastatin tablet 40 mg  40 mg Oral Daily Carole Groves MD   40 mg at 05/15/25 0906    dextrose 50% injection 12.5 g  12.5 g Intravenous PRN Carole Groves MD        dextrose 50% injection 25 g  25 g Intravenous PRN Carole Groves MD        enoxaparin injection 40 mg  40 mg Subcutaneous Daily Carole Groves MD   40 mg at 05/14/25 1812    ezetimibe tablet 10 mg  10 mg Oral Daily Carole Groves MD   10 mg at 05/15/25 0906    glucagon (human recombinant) injection 1 mg  1 mg Intramuscular PRN Carole Groves MD        glucose chewable tablet 16 g  16 g Oral PRN Carole Groves MD        glucose chewable tablet 24 g  24 g Oral PRN Carole Groves MD        lisinopriL tablet 10 mg  10 mg Oral Daily Carole Groves MD   10 mg at 05/15/25 0906    methylPREDNISolone sodium succinate (SOLU-MEDROL) 1,000 mg in 0.9% NaCl 100 mL IVPB  1,000 mg Intravenous Daily Raymon Bruce  mL/hr at 05/15/25 1023 1,000 mg at 05/15/25 1023    naloxone 0.4 mg/mL injection 0.02 mg  0.02 mg Intravenous PRN Carole Groves MD        NON FORMULARY MEDICATION 5.5 mL  5.5 mL Both Eyes QID Anthony Mcintyre MD        ondansetron injection 4 mg  4 mg Intravenous Q8H PRN Carole Groves MD        pantoprazole EC tablet 40 mg  40 mg Oral Daily Carole Groves MD   40 mg at 05/15/25 0991    polyethylene glycol packet 17 g  17 g Oral  Daily Carole Groves MD   17 g at 05/15/25 0907    promethazine tablet 25 mg  25 mg Oral Q6H PRN Carole Groves MD        sodium chloride 0.9% flush 3 mL  3 mL Intravenous Q6H PRN Carole Groves MD        vitamin D 1000 units tablet 1,000 Units  1,000 Units Oral Daily Carole Groves MD   1,000 Units at 05/15/25 0906

## 2025-05-15 NOTE — SUBJECTIVE & OBJECTIVE
Subjective:     Interval History:   Visual symptoms unchanged.  Started on high-dose IV steroids yesterday evening, tolerated well.    Mri orbits w w/o revealed some focal enhancement along inferior R optic nerve.   Results d/w pt and pt's wife at bedside per Dr. Bruce.  Current Neurological Medications:     Current Medications[1]    Review of Systems  A 14pt ros was reviewed & is negative unless o/w documented in the hpi    Objective:     Vital Signs (Most Recent):  Temp: 98.4 °F (36.9 °C) (05/15/25 0810)  Pulse: 90 (05/15/25 0810)  Resp: 19 (05/15/25 0810)  BP: (!) 139/90 (05/15/25 0810)  SpO2: 97 % (05/15/25 0810) Vital Signs (24h Range):  Temp:  [97.5 °F (36.4 °C)-98.6 °F (37 °C)] 98.4 °F (36.9 °C)  Pulse:  [70-92] 90  Resp:  [13-19] 19  SpO2:  [94 %-98 %] 97 %  BP: (125-157)/(72-94) 139/90     Weight: 79.4 kg (175 lb)  Body mass index is 25.84 kg/m².     Physical Exam   Vitals reviewed.   Constitutional:       General: He is awake.      Appearance: Normal appearance.   HENT:      Head: Normocephalic and atraumatic.      Nose: Nose normal.      Mouth/Throat:      Mouth: Mucous membranes are moist.      Pharynx: Oropharynx is clear.   Eyes:      Extraocular Movements: EOM normal.      Pupils: Pupils are equal, round, and reactive to light.   Pulmonary:      Effort: Pulmonary effort is normal.   Musculoskeletal:         General: Normal range of motion.      Cervical back: Normal range of motion.   Skin:     General: Skin is warm and dry.   Neurological:      Mental Status: He is alert and oriented to person, place, and time.      Motor: Motor strength is normal.  Psychiatric:         Mood and Affect: Mood normal.         Speech: Speech normal.         Behavior: Behavior normal. Behavior is cooperative.         Thought Content: Thought content normal.         NEUROLOGICAL EXAMINATION:      MENTAL STATUS   Oriented to person, place, and time.   Follows 3 step commands.   Attention: normal. Concentration: normal.    Speech: speech is normal   Level of consciousness: alert  Knowledge: good.   Normal comprehension.      CRANIAL NERVES      CN II   Right visual field deficit: upper temporal and upper nasal (total upper vision loss, with decreased visual acuity to lower fields) quadrant(s)  Left visual field deficit: blurred vision.     CN III, IV, VI   Pupils are equal, round, and reactive to light.  Extraocular motions are normal.   Nystagmus: none   Conjugate gaze: present     CN V   Facial sensation intact.      CN VII   Facial expression full, symmetric.      MOTOR EXAM   Muscle bulk: normal  Overall muscle tone: normal  Right arm pronator drift: absent  Left arm pronator drift: absent     Strength   Strength 5/5 throughout.      SENSORY EXAM   Light touch normal.           Significant Labs:   Recent Lab Results         05/14/25  1152   05/14/25  1151        Albumin/Globulin Ratio   1.4       Albumin   4.2       ALP   47       ALT   21       Anion Gap   9.0       AST   15       Baso # 0.01         Basophil % 0.2         BILIRUBIN TOTAL   0.4       BUN   15.5       BUN/CREAT RATIO   18       Calcium   9.4       Chloride   105       CO2   25       Creatinine   0.86       CRP   <1.00       eGFR   >60  Comment: Estimated GFR calculated using the CKD-EPI creatinine (2021) equation.       Eos # 0.20         Eos % 3.0         Globulin, Total   3.1       Glucose   100       Hematocrit 47.4         Hemoglobin 15.5         Immature Grans (Abs) 0.03         Immature Granulocytes 0.5         Lymph # 2.45         LYMPH % 37.0         MCH 29.8         MCHC 32.7         MCV 91.2         Mono # 0.76         Mono % 11.5         MPV 10.4         Neut # 3.18         Neut % 47.8         nRBC 0.0         Platelet Count 276         Potassium   4.1       PROTEIN TOTAL   7.3       RBC 5.20         RDW 13.3         Sed Rate 3         Sodium   139       WBC 6.63                 Significant Imaging:   MRI orbits w w/o:  Impression:     1. No evidence  of optic nerve edema.  2. Nonspecific focal enhancement along the inferior margin of the right optic nerve, may be vascular.  3. Otherwise no acute abnormality of the orbits.       I have reviewed all pertinent imaging results/findings within the past 24 hours.       [1]   Current Facility-Administered Medications   Medication Dose Route Frequency Provider Last Rate Last Admin    acetaminophen tablet 650 mg  650 mg Oral Q8H PRN Carole Groves MD        acetaminophen tablet 650 mg  650 mg Oral Q4H PRN Carole Groves MD        ascorbic acid (vitamin C) tablet 1,000 mg  1,000 mg Oral Daily Carole Groves MD   1,000 mg at 05/15/25 0906    aspirin EC tablet 81 mg  81 mg Oral Daily Carole Groves MD   81 mg at 05/15/25 0906    atorvastatin tablet 40 mg  40 mg Oral Daily Carole Groves MD   40 mg at 05/15/25 0906    dextrose 50% injection 12.5 g  12.5 g Intravenous PRN Carole Groves MD        dextrose 50% injection 25 g  25 g Intravenous PRN Carole Groves MD        enoxaparin injection 40 mg  40 mg Subcutaneous Daily Carole Groves MD   40 mg at 05/14/25 1812    ezetimibe tablet 10 mg  10 mg Oral Daily Carole Groves MD   10 mg at 05/15/25 0906    glucagon (human recombinant) injection 1 mg  1 mg Intramuscular PRN Carole Groves MD        glucose chewable tablet 16 g  16 g Oral PRN Carole Groves MD        glucose chewable tablet 24 g  24 g Oral PRN Carole Groves MD        lisinopriL tablet 10 mg  10 mg Oral Daily Carole Groves MD   10 mg at 05/15/25 0906    methylPREDNISolone sodium succinate (SOLU-MEDROL) 1,000 mg in 0.9% NaCl 100 mL IVPB  1,000 mg Intravenous Daily Raymon Bruce  mL/hr at 05/15/25 1023 1,000 mg at 05/15/25 1023    naloxone 0.4 mg/mL injection 0.02 mg  0.02 mg Intravenous PRN Carole Groves MD        NON FORMULARY MEDICATION 5.5 mL  5.5 mL Both Eyes QID Anthony Mcintyre MD        ondansetron injection 4 mg  4 mg Intravenous Q8H PRN Carole Groves MD        pantoprazole  EC tablet 40 mg  40 mg Oral Daily Carole Groves MD   40 mg at 05/15/25 0906    polyethylene glycol packet 17 g  17 g Oral Daily Carole Groves MD   17 g at 05/15/25 0907    promethazine tablet 25 mg  25 mg Oral Q6H PRN Carole Groves MD        sodium chloride 0.9% flush 3 mL  3 mL Intravenous Q6H PRN Carole Groves MD        vitamin D 1000 units tablet 1,000 Units  1,000 Units Oral Daily Carole Groves MD   1,000 Units at 05/15/25 0906

## 2025-05-15 NOTE — PLAN OF CARE
Problem: Adult Inpatient Plan of Care  Goal: Plan of Care Review  5/15/2025 0447 by Elizabeth Veras RN  Outcome: Progressing  Flowsheets (Taken 5/15/2025 0447)  Plan of Care Reviewed With: patient  5/14/2025 1835 by Elizabeth Veras RN  Outcome: Progressing  Flowsheets (Taken 5/14/2025 1835)  Plan of Care Reviewed With: patient  Goal: Patient-Specific Goal (Individualized)  Outcome: Progressing  Goal: Absence of Hospital-Acquired Illness or Injury  Outcome: Progressing  Intervention: Identify and Manage Fall Risk  5/15/2025 0447 by Elizabeth Veras RN  Flowsheets (Taken 5/15/2025 0447)  Safety Promotion/Fall Prevention:   assistive device/personal item within reach   medications reviewed  5/14/2025 1835 by Elizabeth Veras RN  Flowsheets (Taken 5/14/2025 1835)  Safety Promotion/Fall Prevention:   assistive device/personal item within reach   Fall Risk signage in place   medications reviewed   side rails raised x 3  Intervention: Prevent Skin Injury  5/15/2025 0447 by Elizabeth Veras RN  Flowsheets (Taken 5/15/2025 0447)  Body Position: position changed independently  Skin Protection: transparent dressing maintained  5/14/2025 1835 by Elizabeth Veras RN  Flowschristina (Taken 5/14/2025 1835)  Body Position: position changed independently  Skin Protection: transparent dressing maintained  Intervention: Prevent and Manage VTE (Venous Thromboembolism) Risk  5/15/2025 0447 by Elizabeth Veras RN  Flowsheets (Taken 5/15/2025 0447)  VTE Prevention/Management:   bleeding risk assessed   ambulation promoted   bleeding precautions maintained   fluids promoted  5/14/2025 1835 by Elizabeth Veras RN  Flowsheets (Taken 5/14/2025 1835)  VTE Prevention/Management:   bleeding risk assessed   bleeding precautions maintained   ambulation promoted   fluids promoted  Intervention: Prevent Infection  5/15/2025 0447 by Elizabeth Veras RN  Flowsheets (Taken 5/15/2025 0447)  Infection Prevention:   rest/sleep promoted    hand hygiene promoted  5/14/2025 1835 by Sario, Estefania, RN  Flowsheets (Taken 5/14/2025 1835)  Infection Prevention:   hand hygiene promoted   rest/sleep promoted  Goal: Optimal Comfort and Wellbeing  Outcome: Progressing  Intervention: Monitor Pain and Promote Comfort  5/15/2025 0447 by Sario, Estefania, RN  Flowsheets (Taken 5/15/2025 0447)  Pain Management Interventions:   care clustered   medication offered   relaxation techniques promoted   quiet environment facilitated  5/14/2025 1835 by Sario, Estefania, RN  Flowsheets (Taken 5/14/2025 1835)  Pain Management Interventions:   medication offered   quiet environment facilitated   relaxation techniques promoted  Intervention: Provide Person-Centered Care  5/15/2025 0447 by Sario, Estefania, RN  Flowsheets (Taken 5/15/2025 0447)  Trust Relationship/Rapport:   care explained   thoughts/feelings acknowledged  5/14/2025 1835 by Sario, Estefania, RN  Flowsheets (Taken 5/14/2025 1835)  Trust Relationship/Rapport:   care explained   thoughts/feelings acknowledged  Goal: Readiness for Transition of Care  Outcome: Progressing     Problem: Wound  Goal: Optimal Coping  Outcome: Progressing  Intervention: Support Patient and Family Response  5/15/2025 0447 by Sario, Estefania, RN  Flowsheets (Taken 5/15/2025 0447)  Supportive Measures:   active listening utilized   verbalization of feelings encouraged   relaxation techniques promoted  Family/Support System Care: presence promoted  5/14/2025 1835 by Sario, Estefania, RN  Flowsheets (Taken 5/14/2025 1835)  Supportive Measures:   active listening utilized   verbalization of feelings encouraged   self-care encouraged  Family/Support System Care: presence promoted  Goal: Optimal Functional Ability  Outcome: Progressing  Intervention: Optimize Functional Ability  5/15/2025 0447 by Sario, Estefania, RN  Flowsheets (Taken 5/15/2025 0447)  Activity Management:   Ambulated in alom[place]te - L4   Ambulated in room - L4  Activity  Assistance Provided: independent  5/14/2025 1835 by Elizabeth Veras RN Flowsheets (Taken 5/14/2025 1835)  Activity Management:   Ambulated in room - L4   Sitting at edge of bed - L2  Activity Assistance Provided: independent  Goal: Absence of Infection Signs and Symptoms  Outcome: Progressing  Intervention: Prevent or Manage Infection  5/15/2025 0447 by Elizabeth Veras RN  Flowsheets (Taken 5/15/2025 0447)  Infection Management: aseptic technique maintained  Isolation Precautions: precautions maintained  5/14/2025 1835 by Elizabeth Veras RN Flowsheets (Taken 5/14/2025 1835)  Infection Management: aseptic technique maintained  Isolation Precautions: precautions maintained  Goal: Improved Oral Intake  Outcome: Progressing  Goal: Optimal Pain Control and Function  Outcome: Progressing  Intervention: Prevent or Manage Pain  5/15/2025 0447 by Elizabeth Veras RN Flowsheets (Taken 5/15/2025 0447)  Sleep/Rest Enhancement:   relaxation techniques promoted   regular sleep/rest pattern promoted  Pain Management Interventions:   care clustered   medication offered   relaxation techniques promoted   quiet environment facilitated  5/14/2025 1835 by Elizabeth Veras RN Flowsheets (Taken 5/14/2025 1835)  Sleep/Rest Enhancement:   relaxation techniques promoted   regular sleep/rest pattern promoted  Pain Management Interventions:   medication offered   quiet environment facilitated   relaxation techniques promoted  Goal: Skin Health and Integrity  Outcome: Progressing  Intervention: Optimize Skin Protection  5/15/2025 0447 by Sario, Estefania, RN  Flowsheets (Taken 5/15/2025 0447)  Pressure Reduction Techniques: frequent weight shift encouraged  Skin Protection: transparent dressing maintained  Activity Management:   Ambulated in [place] - L4   Ambulated in room - L4  Head of Bed (HOB) Positioning: HOB at 30 degrees  5/14/2025 1835 by Sario, Estefania, RN  Flowsheets (Taken 5/14/2025 1835)  Pressure Reduction  Techniques: frequent weight shift encouraged  Skin Protection: transparent dressing maintained  Activity Management:   Ambulated in room - L4   Sitting at edge of bed - L2  Head of Bed (HOB) Positioning: HOB flat  Goal: Optimal Wound Healing  Outcome: Progressing  Intervention: Promote Wound Healing  5/15/2025 0447 by Elizabeth Veras, RN  Flowsheets (Taken 5/15/2025 0447)  Sleep/Rest Enhancement:   relaxation techniques promoted   regular sleep/rest pattern promoted  5/14/2025 1835 by Elizabeth Veras, RN  Flowsheets (Taken 5/14/2025 1835)  Sleep/Rest Enhancement:   relaxation techniques promoted   regular sleep/rest pattern promoted

## 2025-05-15 NOTE — NURSING
Patient verbalized that he is released from [facility] more than 10 years ago. He asked to change heart healthy to regular diet. Also state that he takes Xanax 0.5 mg at night for sleep. NP Faviola informed. Diet changed to Regular. Xanax 0.5 mg given.

## 2025-05-15 NOTE — ASSESSMENT & PLAN NOTE
Painless, b/l, acute vision loss (R>L)  MRI orbits w w/o: focal enhancement along the inferior margin of the right optic nerve     -on IVMP 1 gm daily x3 doses  -MRA brain/neck and MRV brain pending  -ophthalmology following  -Further recommendations per MD

## 2025-05-16 VITALS
BODY MASS INDEX: 25.92 KG/M2 | TEMPERATURE: 98 F | HEART RATE: 84 BPM | RESPIRATION RATE: 18 BRPM | OXYGEN SATURATION: 97 % | SYSTOLIC BLOOD PRESSURE: 145 MMHG | HEIGHT: 69 IN | DIASTOLIC BLOOD PRESSURE: 81 MMHG | WEIGHT: 175 LBS

## 2025-05-16 PROBLEM — H46.9 OPTIC NEURITIS: Status: ACTIVE | Noted: 2025-05-16

## 2025-05-16 LAB
ACE SERPL-CCNC: 8 U/L (ref 16–85)
ANA SER QL HEP2 SUBST: NORMAL

## 2025-05-16 PROCEDURE — 25000003 PHARM REV CODE 250: Performed by: INTERNAL MEDICINE

## 2025-05-16 PROCEDURE — 63600175 PHARM REV CODE 636 W HCPCS: Mod: JZ,TB | Performed by: STUDENT IN AN ORGANIZED HEALTH CARE EDUCATION/TRAINING PROGRAM

## 2025-05-16 PROCEDURE — 25000003 PHARM REV CODE 250: Performed by: STUDENT IN AN ORGANIZED HEALTH CARE EDUCATION/TRAINING PROGRAM

## 2025-05-16 RX ORDER — METHYLPREDNISOLONE 4 MG/1
TABLET ORAL
Qty: 21 EACH | Refills: 0 | Status: SHIPPED | OUTPATIENT
Start: 2025-05-16 | End: 2025-06-06

## 2025-05-16 RX ADMIN — LISINOPRIL 10 MG: 10 TABLET ORAL at 09:05

## 2025-05-16 RX ADMIN — Medication 1000 MG: at 09:05

## 2025-05-16 RX ADMIN — EZETIMIBE 10 MG: 10 TABLET ORAL at 09:05

## 2025-05-16 RX ADMIN — ATORVASTATIN CALCIUM 40 MG: 40 TABLET, FILM COATED ORAL at 09:05

## 2025-05-16 RX ADMIN — ASPIRIN 81 MG: 81 TABLET, COATED ORAL at 09:05

## 2025-05-16 RX ADMIN — PANTOPRAZOLE SODIUM 40 MG: 40 TABLET, DELAYED RELEASE ORAL at 09:05

## 2025-05-16 RX ADMIN — Medication 1000 UNITS: at 09:05

## 2025-05-16 RX ADMIN — METHYLPREDNISOLONE SODIUM SUCCINATE 1000 MG: 1 INJECTION, POWDER, LYOPHILIZED, FOR SOLUTION INTRAMUSCULAR; INTRAVENOUS at 10:05

## 2025-05-16 NOTE — CONSULTS
Came by to see patient today, but he is dressed and about to leave/be discharged.  He states his vision is either slightly better, but no worse than it was when he came in.  He has received IV steroids and is being discharged on po steroids.      He states he has been in touch with Dr Reyna, and has f/u scheduled on Wednesday of next week.  I asked him to let us know if he feels any worse by Sunday, and we would be able to see him in clinic Monday morning.

## 2025-05-16 NOTE — DISCHARGE SUMMARY
[facility] Medicine Discharge Summary    Admit Date: 5/14/2025  Discharge Date and Time: 5/16/20252:22 PM  Admitting Physician:  Team  Discharging Physician: Anthony Mcintyre MD.  Primary Care Physician: Glen Sam III, MD  Consults: Neurology/ophthalmology    Discharge Diagnoses:  Acute loss of vision, bilateral  Painless, b/l, acute vision loss (R>L)  - mri brain wwo contrast  w no acute findings   - mri orbits wwo contrast - Nonspecific focal enhancement along the inferior margin of the right optic nerve, may be vascular.  - post solumedrol 1 gram iv x 3 days   - ophthalmology consulted   -Status post bilateral recent cataract surgery in April, 2025    History of CAD status post remote PCI   Essential HTN-slightly accelerated   HLD  CAD   Acute MI   HTN  HLD   Cataractpost surgical intervention 4/25    Hospital Course:   69-year-old  male with significant history of CAD status post PCI 15 years back on baby aspirin, HTN, HLD, cataracts status post recent surgery on both eyes on April, 2025.  Patient has started noticing right eye superior vision loss almost 10 days back.  Vision loss progressively worsened and he has been having symptoms on the left side for the past 1 day.  Patient reports he was treated by his PCP with oral prednisone for 5 days which he completed on 05/10.  Patient was evaluated by ophthalmologist today and had concerns for bilateral optic neuritis and therefore was sent to the ED for further evaluation.  Patient neurologically intact except for the vision loss.  Neurology team consulted and hospitalist team consulted for admission.  MRI brain negative.  MRI orbit with nonspecific focal enhancement along the inferior margin of right optic nerve.   Sed rate/C-reactive protein within normal limits.      Neurology was consulted and patient was seen by Dr. Bruce , after assessment with an impression pertinent for etiology as complication  from recent surgery versus non artery retic ischemic optic neuropathy, unlikely to be new onset autoimmune disease or MS.  Decision was done to give Solu-Medrol 1 g IV x3 days.  Anti-MOG, anti AQP4, serum ACE, IL-2, BOZENA antibodies drawn      The patient refers some improvement.  Patient has a follow-up appointment with Dr. Ellie jansen this coming Wednesday.  If symptoms worsens instructions were given to patient to call ophthalmology Dr. Lindsey Newell to be seen earlier at clinic.  Patient will be discharged on a Medrol Dosepak as recommended by Dr. Perera.     Pt was seen and examined on the day of discharge  Vitals:  VITAL SIGNS: 24 HRS MIN & MAX LAST   Temp  Min: 97.7 °F (36.5 °C)  Max: 98.6 °F (37 °C) 97.7 °F (36.5 °C)   BP  Min: 120/78  Max: 174/84 (!) 145/81   Pulse  Min: 68  Max: 98  84   Resp  Min: 18  Max: 20 18   SpO2  Min: 94 %  Max: 97 % 97 %       Physical Exam:  General: He is awake.      Appearance: Normal appearance.   HENT:      Head: Normocephalic and atraumatic.      Nose: Nose normal.      Mouth/Throat:      Mouth: Mucous membranes are moist.      Pharynx: Oropharynx is clear.   Eyes:      Extraocular Movements: EOM normal.      Pupils: Pupils are equal, round, and reactive to light.   Pulmonary:      Effort: Pulmonary effort is normal.   Musculoskeletal:         General: Normal range of motion.      Cervical back: Normal range of motion.   Skin:     General: Skin is warm and dry.   Neurological:      Mental Status: He is alert and oriented to person, place, and time.      Motor: Motor strength is normal.  CN II   Right visual field deficit: upper temporal and upper nasal (total upper vision loss, with decreased visual acuity to lower fields) quadrant(s)  Left visual field deficit: blurred vision.     CN III, IV, VI   Pupils are equal, round, and reactive to light.  Extraocular motions are normal.   Nystagmus: none   Conjugate gaze: present  Psychiatric:         Mood and Affect: Mood normal.          Speech: Speech normal.         Behavior: Behavior normal. Behavior is cooperative.         Thought Content: Thought content normal.     Procedures Performed: No admission procedures for hospital encounter.     Significant Diagnostic Studies: See Full reports for all details    Recent Labs   Lab 05/14/25  1152   WBC 6.63   RBC 5.20   HGB 15.5   HCT 47.4   MCV 91.2   MCH 29.8   MCHC 32.7*   RDW 13.3      MPV 10.4       Recent Labs   Lab 05/14/25  1151 05/15/25  1122     --    K 4.1  --      --    CO2 25  --    BUN 15.5  --    CREATININE 0.86 0.89     --    CALCIUM 9.4  --    ALBUMIN 4.2  --    PROT 7.3  --    ALKPHOS 47  --    ALT 21  --    AST 15  --    BILITOT 0.4  --         Microbiology Results (last 7 days)       ** No results found for the last 168 hours. **             MRA Neck without contrast  Narrative: EXAMINATION:  MRA NECK WITHOUT CONTRAST    CLINICAL HISTORY:  Unspecified visual loss Vision loss, monocular;    TECHNIQUE:  Two-dimensional time of flight neck MR angiography of extracranial arterial system was performed and reformatted images are presented in 3-D maximum intensity rotational projections. 3-D time-of-flight MR angiography centered at the carotid bifurcations was included.    COMPARISON:  None available    FINDINGS:  Evaluation is limited by motion artifact.    Visualized portions of the common carotid arteries, carotid bulbs and internal arteries are patent.    Visualized portions of the vertebral arteries are patent.  Impression: No large vessel occlusion.    Electronically signed by: Jael Moran  Date:    05/16/2025  Time:    10:26  MRA Brain without contrast  Narrative: EXAMINATION:  MRA BRAIN WITHOUT CONTRAST    CLINICAL HISTORY:  Unspecified visual loss Vision loss, monocular;    TECHNIQUE:  - Three-dimensional time of flight brain MR angiography of intracranial vessels is performed, and maximum intensity projection reformatted images are presented  in multiple three-dimensional rotational projections.    COMPARISON:  None available    FINDINGS:  Evaluation is limited by motion artifact.    The internal carotid artery, cerebral arteries and anterior cerebral arteries overall appear patent.    The vertebral, basilar and posterior cerebral arteries overall appear patent.  Impression: Evaluation limited by motion artifact.  No evidence of large vessel occlusion.    Electronically signed by: Jael Moran  Date:    05/16/2025  Time:    10:25  MRV Brain Without Contrast  Narrative: EXAMINATION:  MRV BRAIN WITHOUT CONTRAST    CLINICAL HISTORY:  Headache, unspecified Headache, papilledema;    TECHNIQUE:  2D MRV of the brain was obtained without contrast in the coronal and sagittal oblique sagittal planes with 3D MIP reformats.    COMPARISON:  MRI brain dated 05/14/2025    FINDINGS:  There is narrowing of the right transverse and sigmoid sinuses which is likely artifactual.  The dural venous sinuses are otherwise patent.  Impression: Narrowing of the right transverse and sigmoid sinuses may be artifactual.  No evidence of dural venous sinus thrombosis.    Electronically signed by: Jael Moran  Date:    05/16/2025  Time:    10:24         Medication List        ASK your doctor about these medications      acetaminophen 650 MG Tbsr  Commonly known as: TYLENOL     ALPRAZolam 0.5 MG Tb24  Commonly known as: XANAX XR     BABY ASPIRIN ORAL     cycloSPORINE 0.1 % Dpet  Ask about: Which instructions should I use?     ezetimibe 10 mg tablet  Commonly known as: ZETIA     GLUCOS CHOND CPLX ADVANCED 750 mg-100 mg- 125 mg-1.65 mg Tab  Generic drug: glucosam-chond-hrb 149-hyal ac     lisinopriL 10 MG tablet     omeprazole 20 MG capsule  Commonly known as: PRILOSEC     ONE DAILY MULTIVITAMIN per tablet  Generic drug: multivitamin     rosuvastatin 20 MG tablet  Commonly known as: CRESTOR     saw palmetto 500 MG capsule     VITAMIN C 1000 MG tablet  Generic drug: ascorbic acid  (vitamin C)     vitamin D 1000 units Tab  Commonly known as: VITAMIN D3     zinc gluconate 50 mg tablet               Explained in detail to the patient about the discharge plan, medications, and follow-up visits. Pt understands and agrees with the treatment plan  Discharge Disposition:  Home  Discharged Condition: stable  Diet- cardiac  Dietary Orders (From admission, onward)       Start     Ordered    05/15/25 0243  Diet Adult Regular Standard Tray  Diet effective now        Question:  Tray type:  Answer:  Standard Tray    05/15/25 0243                   Medications Per DC med rec  Activities as tolerated   Follow-up Information       Glen Sam III, MD Follow up in 2 week(s).    Specialty: Family Medicine  Why: We will call you with your appointment details next week!  Contact information:  [address] [facility] [address]  [phone]               Reyna, Ellie L., MD. Go on 5/21/2025.    Specialty: Ophthalmology  Why: Keep appointment and follow up on Wednesday  Contact information:  [address]  [facility] [address]  [phone]                           For further questions contact hospitalist office    Discharge time 33 minutes    For worsening symptoms, chest pain, shortness of breath, increased abdominal pain, high grade fever, stroke or stroke like symptoms, immediately go to the nearest Emergency Room or call 911 as soon as possible.      Anthony Celeste M.D, on 5/16/2025. at 2:22 PM.

## 2025-05-16 NOTE — PROGRESS NOTES
[facility] Hospital Medicine Progress Note        Chief Complaint: Inpatient Follow-up for Eye Problem (Hx cataracts Sx on April 8 and 22, 2025. Pt began having vision loss in R eye on May 8, 2025. Pt seen by Dr Reyna this AM, advised to come to ED D/T bilat optic nerve swelling. )       HPI: 69-year-old  male with significant history of CAD status post PCI 15 years back on baby aspirin, HTN, HLD, cataracts status post recent surgery on both sides in April, 2025.  Patient has started noticing right eye superior vision loss almost 10 days back.  Vision loss progressively worsened and he has been having symptoms on the left side for the past 1 day.  Patient reports he was treated by his PCP with oral prednisone for 5 days which he completed on 05/10.  Patient was evaluated by ophthalmologist today and had concerns for bilateral optic neuritis and therefore was sent to the ED for further evaluation.  Patient neurologically intact except for the vision loss.  Neurology team consulted and hospitalist team consulted for admission.  MRI brain negative.  MRI orbit with nonspecific focal enhancement along the inferior margin of right optic nerve.         Interval Hx:     5/15/25 dr arzate - pt was seen by neurology and started on high dose solumedrol 1gram  x 3 doses    Case was discussed with patient's nurse and  on the floor.    Objective/physical exam:  General appearance:  No acute distress  HENT: Atraumatic   Lungs: Clear to auscultation bilaterally.   Heart: RRR,No edema  Abdomen: Soft, non tender   Extremities: warm  Neuro:  Awake, alert, oriented x4  Psych/mental status: Appropriate mood and affect.    VITAL SIGNS: 24 HRS MIN & MAX LAST   Temp  Min: 97.8 °F (36.6 °C)  Max: 98.6 °F (37 °C) 98 °F (36.7 °C)   BP  Min: 126/82  Max: 170/85 (!) 143/91   Pulse  Min: 72  Max: 98  72   Resp  Min: 18  Max: 20 18   SpO2  Min: 94 %  Max: 97 % 96 %     I have reviewed the  following labs:  Recent Labs   Lab 05/14/25  1152   WBC 6.63   RBC 5.20   HGB 15.5   HCT 47.4   MCV 91.2   MCH 29.8   MCHC 32.7*   RDW 13.3      MPV 10.4     Recent Labs   Lab 05/14/25  1151 05/15/25  1122     --    K 4.1  --      --    CO2 25  --    BUN 15.5  --    CREATININE 0.86 0.89     --    CALCIUM 9.4  --    ALBUMIN 4.2  --    PROT 7.3  --    ALKPHOS 47  --    ALT 21  --    AST 15  --    BILITOT 0.4  --      Microbiology Results (last 7 days)       ** No results found for the last 168 hours. **             See below for Radiology    Assessment/Plan:  Suspected bilateral optic neuritis   - mri brain wwo contrast  w no acute findings   - mri orbits wwo contrast - Nonspecific focal enhancement along the inferior margin of the right optic nerve, may be vascular.  - solumedrol 1 gram iv x 3 days   - ophthalmology consulted   Bilateral vision abnormality  Status post bilateral recent cataract surgery in April, 2025  History of CAD status post remote PCI   Essential HTN-slightly accelerated   HLD  CAD   Acute MI   HTN  HLD   Cataract    Neurology on board   MRI brain is unremarkable   MRI orbit with nonspecific findings  Neurology has ordered autoimmune workup-interleukin-2, Ace, mi in oligodendrocytoma glycoprotein, BOZENA  Inflammatory markers are normal  Will also request Ophthalmology to continue to follow   Neurology has initiated him on high-dose IV Solu-Medrol-day 1/3  Labs stable   Resumed home meds-aspirin, statin, vitamin-C, Zetia, lisinopril, ppi, prednisolone eyedrops   DVT prophylaxis-subQ Lovenox            VTE prophylaxis: lovenox     Patient condition:  Stable/    Anticipated discharge and Disposition:   tbd       All diagnosis and differential diagnosis have been reviewed; assessment and plan has been documented; I have personally reviewed the labs and test results that are presently available; I have reviewed the patients medication list; I have reviewed the consulting  providers response and recommendations. I have reviewed or attempted to review medical records based upon their availability    All of the patient's questions have been  addressed and answered. Patient's is agreeable to the above stated plan. I will continue to monitor closely and make adjustments to medical management as needed.    Portions of this note dictated using EMR integrated voice recognition software, and may be subject to voice recognition errors not corrected at proofreading. Please contact writer for clarification if needed.   _____________________________________________________________________    Malnutrition Status:  Nutrition consulted. Most recent weight and BMI monitored-     Measurements:  Wt Readings from Last 1 Encounters:   05/14/25 79.4 kg (175 lb)   Body mass index is 25.84 kg/m².    Patient has been screened and assessed by RD.    Malnutrition Type:  Context:    Level:      Malnutrition Characteristic Summary:       Interventions/Recommendations (treatment strategy):        Scheduled Med:   ascorbic acid (vitamin C)  1,000 mg Oral Daily    aspirin  81 mg Oral Daily    atorvastatin  40 mg Oral Daily    enoxparin  40 mg Subcutaneous Daily    ezetimibe  10 mg Oral Daily    lisinopriL  10 mg Oral Daily    methylPREDNISolone injection (PEDS and ADULTS)  1,000 mg Intravenous Daily    pantoprazole  40 mg Oral Daily    polyethylene glycol  17 g Oral Daily    vitamin D  1,000 Units Oral Daily      Continuous Infusions:     PRN Meds:    Current Facility-Administered Medications:     acetaminophen, 650 mg, Oral, Q8H PRN    acetaminophen, 650 mg, Oral, Q4H PRN    ALPRAZolam, 0.5 mg, Oral, Nightly PRN    cetirizine, 10 mg, Oral, Daily PRN    dextrose 50%, 12.5 g, Intravenous, PRN    dextrose 50%, 25 g, Intravenous, PRN    glucagon (human recombinant), 1 mg, Intramuscular, PRN    glucose, 16 g, Oral, PRN    glucose, 24 g, Oral, PRN    naloxone, 0.02 mg, Intravenous, PRN    ondansetron, 4 mg, Intravenous,  Q8H PRN    promethazine, 25 mg, Oral, Q6H PRN    sodium chloride 0.9%, 3 mL, Intravenous, Q6H PRN     Radiology:  I have personally reviewed the following imaging and agree with the radiologist.     MRI Orbits W W/O Contrast  Narrative: EXAMINATION:  MRI ORBITS W W/O CONTRAST    CLINICAL HISTORY:  Optic neuritis suspected;    TECHNIQUE:  Multiplanar, multisequence MR images of the orbits were obtained with and without administration of intravenous contrast.    COMPARISON:  None    FINDINGS:  The globes are normal in contour.  There are changes of bilateral cataract surgery.  The optic nerves are symmetric in size and signal.  There is no evidence of optic nerve edema.  There is focal enhancement along the inferior margin of the right optic nerve (series 15, image 15).  There is no enhancing mass or inflammatory change within the orbits.  The extraocular muscles are symmetric.  The lacrimal glands are normal in appearance.  Meckel's caves and the cavernous sinuses are unremarkable.  Impression: 1. No evidence of optic nerve edema.  2. Nonspecific focal enhancement along the inferior margin of the right optic nerve, may be vascular.  3. Otherwise no acute abnormality of the orbits.    Electronically signed by: Jael Moran  Date:    05/14/2025  Time:    15:37  MRI Brain W WO Contrast  Narrative: EXAMINATION:  MRI BRAIN W WO CONTRAST    CLINICAL HISTORY:  Optic neuritis suspected;    TECHNIQUE:  Multiplanar, multisequence MR images of the brain were obtained with and without administration of intravenous contrast.    COMPARISON:  None    FINDINGS:  There is no restricted diffusion, hemorrhage or edema.  Mild scattered T2/FLAIR hyperintensities in the subcortical periventricular white matter likely represent chronic microvascular ischemic changes.  There is no abnormal parenchymal or leptomeningeal enhancement.    There is no mass effect or midline shift.  The basal cisterns are patent.  The ventricles are normal in  size.  There is no abnormal extra-axial fluid collection.  The major intracranial flow voids are patent.  The paranasal sinuses are clear  Impression: 1. No acute intracranial abnormality.  2. Mild chronic microvascular ischemic changes.    Electronically signed by: Jael Moran  Date:    05/14/2025  Time:    15:28      Anthony Mcintyre MD  Department of Hospital Medicine   [facility]   05/16/2025

## 2025-05-16 NOTE — DISCHARGE INSTRUCTIONS
Please return to the ER with any worsening of your symptoms including:  -Visual changes  -Loss of vision  -Increased spots or blurriness  -Unrelieved headache    Call your primary care provider or opthalmology with any further questions or concerns. Please attend and schedule all recommended follow-up appointments.     This is the number to Dr. Chauhan's office if vision is getting worse by Sunday and you need an emergent appointment on Monday.  [phone]

## 2025-05-16 NOTE — PLAN OF CARE
Problem: Adult Inpatient Plan of Care  Goal: Plan of Care Review  Outcome: Met  Goal: Patient-Specific Goal (Individualized)  Outcome: Met  Goal: Absence of Hospital-Acquired Illness or Injury  Outcome: Met  Goal: Optimal Comfort and Wellbeing  Outcome: Met  Goal: Readiness for Transition of Care  Outcome: Met

## 2025-05-16 NOTE — PLAN OF CARE
Problem: Adult Inpatient Plan of Care  Goal: Plan of Care Review  Outcome: Progressing  Flowsheets (Taken 5/16/2025 0423)  Plan of Care Reviewed With: patient  Goal: Patient-Specific Goal (Individualized)  Outcome: Progressing  Flowsheets (Taken 5/16/2025 0423)  Individualized Care Needs: monitor vision changes  Goal: Absence of Hospital-Acquired Illness or Injury  Outcome: Progressing  Intervention: Identify and Manage Fall Risk  Flowsheets (Taken 5/16/2025 0423)  Safety Promotion/Fall Prevention:   assistive device/personal item within reach   side rails raised x 2   Fall Risk reviewed with patient/family   nonskid shoes/socks when out of bed  Intervention: Prevent Skin Injury  Flowsheets (Taken 5/16/2025 0423)  Body Position:   weight shifting   position changed independently  Intervention: Prevent and Manage VTE (Venous Thromboembolism) Risk  Flowsheets (Taken 5/16/2025 0423)  VTE Prevention/Management:   ambulation promoted   fluids promoted  Intervention: Prevent Infection  Flowsheets (Taken 5/16/2025 0423)  Infection Prevention:   hand hygiene promoted   rest/sleep promoted   single patient room provided  Goal: Optimal Comfort and Wellbeing  Outcome: Progressing  Intervention: Monitor Pain and Promote Comfort  Flowsheets (Taken 5/16/2025 0423)  Pain Management Interventions:   care clustered   quiet environment facilitated  Intervention: Provide Person-Centered Care  Flowsheets (Taken 5/16/2025 0423)  Trust Relationship/Rapport:   care explained   choices provided   questions answered   questions encouraged   thoughts/feelings acknowledged  Goal: Readiness for Transition of Care  Outcome: Progressing

## 2025-05-18 LAB — IL2 SERPL-MCNC: <2.1 PG/ML

## 2025-05-19 LAB
ANTINUCLEAR ANTIBODY SCREEN (OHS): NEGATIVE
AQP4 H2O CHANNEL IGG SERPL QL: NEGATIVE
CENTROMERE QUANT (OHS): <0.4 U/ML
DSDNA AB QUANT (OHS): 1 IU/ML
JO-1 AB QUANT (OHS): <0.3 U/ML
MOG IGG1 SERPL QL FC: NEGATIVE
RHEUMATOID FACTOR IGA QUANTITATIVE (OLG): 5.2 IU/ML
RHEUMATOID FACTOR IGM QUANTITATIVE (OLG): 0.9 IU/ML
RNP70 AB QUANT (OHS): 0.5 U/ML
SCL-70S AB QUANT (OHS): <0.6 U/ML
SMITH AB QUANT (OHS): <0.7 U/ML
SSA(RO) AB QUANT (OHS): <0.4 U/ML
SSB(LA) AB QUANT (OHS): <0.4 U/ML
U1RNP AB QUANT (OHS): 0.7 U/ML

## 2025-05-29 DIAGNOSIS — H47.011 ISCHEMIC OPTIC NEUROPATHY OF RIGHT EYE: Primary | ICD-10-CM

## 2025-05-30 ENCOUNTER — HOSPITAL ENCOUNTER (INPATIENT)
Facility: HOSPITAL | Age: 70
LOS: 2 days | Discharge: HOME | DRG: 123 | End: 2025-06-01
Attending: EMERGENCY MEDICINE | Admitting: STUDENT IN AN ORGANIZED HEALTH CARE EDUCATION/TRAINING PROGRAM
Payer: MEDICARE

## 2025-05-30 DIAGNOSIS — H46.9 OPTIC NEURITIS: ICD-10-CM

## 2025-05-30 DIAGNOSIS — H54.62 VISION LOSS, LEFT EYE: Primary | ICD-10-CM

## 2025-05-30 DIAGNOSIS — R07.9 CHEST PAIN: ICD-10-CM

## 2025-05-30 DIAGNOSIS — H53.133 ACUTE LOSS OF VISION, BILATERAL: ICD-10-CM

## 2025-05-30 LAB
ALBUMIN SERPL-MCNC: 3.6 G/DL (ref 3.4–4.8)
ALBUMIN/GLOB SERPL: 1.1 RATIO (ref 1.1–2)
ALP SERPL-CCNC: 41 UNIT/L (ref 40–150)
ALT SERPL-CCNC: 23 UNIT/L (ref 0–55)
ANION GAP SERPL CALC-SCNC: 13 MEQ/L
AST SERPL-CCNC: 20 UNIT/L (ref 11–45)
BASOPHILS # BLD AUTO: 0.03 X10(3)/MCL
BASOPHILS NFR BLD AUTO: 0.4 %
BILIRUB SERPL-MCNC: 0.2 MG/DL
BUN SERPL-MCNC: 20.1 MG/DL (ref 8.4–25.7)
CALCIUM SERPL-MCNC: 9 MG/DL (ref 8.8–10)
CHLORIDE SERPL-SCNC: 110 MMOL/L (ref 98–107)
CO2 SERPL-SCNC: 19 MMOL/L (ref 23–31)
CREAT SERPL-MCNC: 0.77 MG/DL (ref 0.72–1.25)
CREAT/UREA NIT SERPL: 26
EOSINOPHIL # BLD AUTO: 0.28 X10(3)/MCL (ref 0–0.9)
EOSINOPHIL NFR BLD AUTO: 3.9 %
ERYTHROCYTE [DISTWIDTH] IN BLOOD BY AUTOMATED COUNT: 14.1 % (ref 11.5–17)
ERYTHROCYTE [SEDIMENTATION RATE] IN BLOOD: <1 MM/HR (ref 0–20)
GFR SERPLBLD CREATININE-BSD FMLA CKD-EPI: >60 ML/MIN/1.73/M2
GLOBULIN SER-MCNC: 3.2 GM/DL (ref 2.4–3.5)
GLUCOSE SERPL-MCNC: 101 MG/DL (ref 82–115)
HCT VFR BLD AUTO: 43.2 % (ref 42–52)
HGB BLD-MCNC: 14.2 G/DL (ref 14–18)
IMM GRANULOCYTES # BLD AUTO: 0.02 X10(3)/MCL (ref 0–0.04)
IMM GRANULOCYTES NFR BLD AUTO: 0.3 %
LYMPHOCYTES # BLD AUTO: 2.47 X10(3)/MCL (ref 0.6–4.6)
LYMPHOCYTES NFR BLD AUTO: 34.6 %
MCH RBC QN AUTO: 29.8 PG (ref 27–31)
MCHC RBC AUTO-ENTMCNC: 32.9 G/DL (ref 33–36)
MCV RBC AUTO: 90.8 FL (ref 80–94)
MONOCYTES # BLD AUTO: 0.83 X10(3)/MCL (ref 0.1–1.3)
MONOCYTES NFR BLD AUTO: 11.6 %
NEUTROPHILS # BLD AUTO: 3.51 X10(3)/MCL (ref 2.1–9.2)
NEUTROPHILS NFR BLD AUTO: 49.2 %
NRBC BLD AUTO-RTO: 0 %
PLATELET # BLD AUTO: 234 X10(3)/MCL (ref 130–400)
PMV BLD AUTO: 10.5 FL (ref 7.4–10.4)
POTASSIUM SERPL-SCNC: 4.8 MMOL/L (ref 3.5–5.1)
PROT SERPL-MCNC: 6.8 GM/DL (ref 5.8–7.6)
RBC # BLD AUTO: 4.76 X10(6)/MCL (ref 4.7–6.1)
SODIUM SERPL-SCNC: 142 MMOL/L (ref 136–145)
WBC # BLD AUTO: 7.14 X10(3)/MCL (ref 4.5–11.5)

## 2025-05-30 PROCEDURE — 99285 EMERGENCY DEPT VISIT HI MDM: CPT | Mod: 25

## 2025-05-30 PROCEDURE — 11000001 HC ACUTE MED/SURG PRIVATE ROOM

## 2025-05-30 PROCEDURE — 85652 RBC SED RATE AUTOMATED: CPT | Performed by: EMERGENCY MEDICINE

## 2025-05-30 PROCEDURE — 85025 COMPLETE CBC W/AUTO DIFF WBC: CPT

## 2025-05-30 PROCEDURE — 80053 COMPREHEN METABOLIC PANEL: CPT

## 2025-05-30 PROCEDURE — 63600175 PHARM REV CODE 636 W HCPCS: Mod: JZ,TB | Performed by: EMERGENCY MEDICINE

## 2025-05-30 PROCEDURE — 96374 THER/PROPH/DIAG INJ IV PUSH: CPT

## 2025-05-30 PROCEDURE — 25000003 PHARM REV CODE 250: Performed by: EMERGENCY MEDICINE

## 2025-05-30 RX ORDER — NALOXONE HCL 0.4 MG/ML
0.02 VIAL (ML) INJECTION
Status: DISCONTINUED | OUTPATIENT
Start: 2025-05-30 | End: 2025-06-01 | Stop reason: HOSPADM

## 2025-05-30 RX ORDER — PROCHLORPERAZINE EDISYLATE 5 MG/ML
5 INJECTION INTRAMUSCULAR; INTRAVENOUS EVERY 6 HOURS PRN
Status: DISCONTINUED | OUTPATIENT
Start: 2025-05-30 | End: 2025-06-01 | Stop reason: HOSPADM

## 2025-05-30 RX ORDER — SODIUM CHLORIDE 0.9 % (FLUSH) 0.9 %
10 SYRINGE (ML) INJECTION
Status: DISCONTINUED | OUTPATIENT
Start: 2025-05-30 | End: 2025-06-01 | Stop reason: HOSPADM

## 2025-05-30 RX ORDER — ACETAMINOPHEN 500 MG
1000 TABLET ORAL EVERY 6 HOURS PRN
Status: DISCONTINUED | OUTPATIENT
Start: 2025-05-30 | End: 2025-06-01 | Stop reason: HOSPADM

## 2025-05-30 RX ORDER — TALC
6 POWDER (GRAM) TOPICAL NIGHTLY PRN
Status: DISCONTINUED | OUTPATIENT
Start: 2025-05-30 | End: 2025-06-01 | Stop reason: HOSPADM

## 2025-05-30 RX ORDER — IBUPROFEN 200 MG
24 TABLET ORAL
Status: DISCONTINUED | OUTPATIENT
Start: 2025-05-30 | End: 2025-06-01 | Stop reason: HOSPADM

## 2025-05-30 RX ORDER — AMOXICILLIN 250 MG
1 CAPSULE ORAL 2 TIMES DAILY PRN
Status: DISCONTINUED | OUTPATIENT
Start: 2025-05-30 | End: 2025-06-01 | Stop reason: HOSPADM

## 2025-05-30 RX ORDER — IBUPROFEN 200 MG
16 TABLET ORAL
Status: DISCONTINUED | OUTPATIENT
Start: 2025-05-30 | End: 2025-06-01 | Stop reason: HOSPADM

## 2025-05-30 RX ORDER — ONDANSETRON HYDROCHLORIDE 2 MG/ML
4 INJECTION, SOLUTION INTRAVENOUS EVERY 4 HOURS PRN
Status: DISCONTINUED | OUTPATIENT
Start: 2025-05-30 | End: 2025-06-01 | Stop reason: HOSPADM

## 2025-05-30 RX ORDER — GLUCAGON 1 MG
1 KIT INJECTION
Status: DISCONTINUED | OUTPATIENT
Start: 2025-05-30 | End: 2025-06-01 | Stop reason: HOSPADM

## 2025-05-30 RX ORDER — BISACODYL 10 MG/1
10 SUPPOSITORY RECTAL DAILY PRN
Status: DISCONTINUED | OUTPATIENT
Start: 2025-05-30 | End: 2025-06-01 | Stop reason: HOSPADM

## 2025-05-30 RX ORDER — ALUMINUM HYDROXIDE, MAGNESIUM HYDROXIDE, AND SIMETHICONE 1200; 120; 1200 MG/30ML; MG/30ML; MG/30ML
30 SUSPENSION ORAL 4 TIMES DAILY PRN
Status: DISCONTINUED | OUTPATIENT
Start: 2025-05-30 | End: 2025-06-01 | Stop reason: HOSPADM

## 2025-05-30 RX ADMIN — METHYLPREDNISOLONE SODIUM SUCCINATE 1000 MG: 1 INJECTION, POWDER, LYOPHILIZED, FOR SOLUTION INTRAMUSCULAR; INTRAVENOUS at 10:05

## 2025-05-31 LAB
ALBUMIN SERPL-MCNC: 3.5 G/DL (ref 3.4–4.8)
ALBUMIN/GLOB SERPL: 1.2 RATIO (ref 1.1–2)
ALP SERPL-CCNC: 36 UNIT/L (ref 40–150)
ALT SERPL-CCNC: 22 UNIT/L (ref 0–55)
ANION GAP SERPL CALC-SCNC: 8 MEQ/L
AST SERPL-CCNC: 22 UNIT/L (ref 11–45)
BASOPHILS # BLD AUTO: 0 X10(3)/MCL
BASOPHILS NFR BLD AUTO: 0 %
BILIRUB SERPL-MCNC: 0.2 MG/DL
BUN SERPL-MCNC: 19.1 MG/DL (ref 8.4–25.7)
CALCIUM SERPL-MCNC: 9.1 MG/DL (ref 8.8–10)
CHLORIDE SERPL-SCNC: 108 MMOL/L (ref 98–107)
CO2 SERPL-SCNC: 24 MMOL/L (ref 23–31)
CREAT SERPL-MCNC: 0.81 MG/DL (ref 0.72–1.25)
CREAT/UREA NIT SERPL: 24
EOSINOPHIL # BLD AUTO: 0 X10(3)/MCL (ref 0–0.9)
EOSINOPHIL NFR BLD AUTO: 0 %
ERYTHROCYTE [DISTWIDTH] IN BLOOD BY AUTOMATED COUNT: 13.9 % (ref 11.5–17)
GFR SERPLBLD CREATININE-BSD FMLA CKD-EPI: >60 ML/MIN/1.73/M2
GLOBULIN SER-MCNC: 3 GM/DL (ref 2.4–3.5)
GLUCOSE SERPL-MCNC: 163 MG/DL (ref 82–115)
HCT VFR BLD AUTO: 43.2 % (ref 42–52)
HGB BLD-MCNC: 14.1 G/DL (ref 14–18)
IMM GRANULOCYTES # BLD AUTO: 0.02 X10(3)/MCL (ref 0–0.04)
IMM GRANULOCYTES NFR BLD AUTO: 0.3 %
LYMPHOCYTES # BLD AUTO: 0.76 X10(3)/MCL (ref 0.6–4.6)
LYMPHOCYTES NFR BLD AUTO: 11.4 %
MAGNESIUM SERPL-MCNC: 1.9 MG/DL (ref 1.6–2.6)
MCH RBC QN AUTO: 29.3 PG (ref 27–31)
MCHC RBC AUTO-ENTMCNC: 32.6 G/DL (ref 33–36)
MCV RBC AUTO: 89.6 FL (ref 80–94)
MONOCYTES # BLD AUTO: 0.06 X10(3)/MCL (ref 0.1–1.3)
MONOCYTES NFR BLD AUTO: 0.9 %
NEUTROPHILS # BLD AUTO: 5.85 X10(3)/MCL (ref 2.1–9.2)
NEUTROPHILS NFR BLD AUTO: 87.4 %
NRBC BLD AUTO-RTO: 0 %
PLATELET # BLD AUTO: 219 X10(3)/MCL (ref 130–400)
PMV BLD AUTO: 10.7 FL (ref 7.4–10.4)
POTASSIUM SERPL-SCNC: 4.7 MMOL/L (ref 3.5–5.1)
PROT SERPL-MCNC: 6.5 GM/DL (ref 5.8–7.6)
RBC # BLD AUTO: 4.82 X10(6)/MCL (ref 4.7–6.1)
SODIUM SERPL-SCNC: 140 MMOL/L (ref 136–145)
WBC # BLD AUTO: 6.69 X10(3)/MCL (ref 4.5–11.5)

## 2025-05-31 PROCEDURE — 83735 ASSAY OF MAGNESIUM: CPT | Performed by: NURSE PRACTITIONER

## 2025-05-31 PROCEDURE — 63600175 PHARM REV CODE 636 W HCPCS: Mod: JZ,TB | Performed by: INTERNAL MEDICINE

## 2025-05-31 PROCEDURE — 36415 COLL VENOUS BLD VENIPUNCTURE: CPT | Performed by: NURSE PRACTITIONER

## 2025-05-31 PROCEDURE — 25000003 PHARM REV CODE 250: Performed by: NURSE PRACTITIONER

## 2025-05-31 PROCEDURE — 85025 COMPLETE CBC W/AUTO DIFF WBC: CPT | Performed by: NURSE PRACTITIONER

## 2025-05-31 PROCEDURE — 80053 COMPREHEN METABOLIC PANEL: CPT | Performed by: NURSE PRACTITIONER

## 2025-05-31 PROCEDURE — A9577 INJ MULTIHANCE: HCPCS | Performed by: STUDENT IN AN ORGANIZED HEALTH CARE EDUCATION/TRAINING PROGRAM

## 2025-05-31 PROCEDURE — 25000003 PHARM REV CODE 250: Performed by: INTERNAL MEDICINE

## 2025-05-31 PROCEDURE — 11000001 HC ACUTE MED/SURG PRIVATE ROOM

## 2025-05-31 PROCEDURE — 25500020 PHARM REV CODE 255: Performed by: STUDENT IN AN ORGANIZED HEALTH CARE EDUCATION/TRAINING PROGRAM

## 2025-05-31 RX ORDER — ATORVASTATIN CALCIUM 40 MG/1
80 TABLET, FILM COATED ORAL NIGHTLY
Status: DISCONTINUED | OUTPATIENT
Start: 2025-05-31 | End: 2025-06-01 | Stop reason: HOSPADM

## 2025-05-31 RX ORDER — EZETIMIBE 10 MG/1
10 TABLET ORAL DAILY
Status: DISCONTINUED | OUTPATIENT
Start: 2025-05-31 | End: 2025-06-01 | Stop reason: HOSPADM

## 2025-05-31 RX ORDER — ALPRAZOLAM 0.5 MG/1
0.5 TABLET ORAL ONCE
Status: COMPLETED | OUTPATIENT
Start: 2025-05-31 | End: 2025-05-31

## 2025-05-31 RX ORDER — ENOXAPARIN SODIUM 100 MG/ML
40 INJECTION SUBCUTANEOUS EVERY 24 HOURS
Status: DISCONTINUED | OUTPATIENT
Start: 2025-05-31 | End: 2025-05-31

## 2025-05-31 RX ORDER — LISINOPRIL 10 MG/1
10 TABLET ORAL DAILY
Status: DISCONTINUED | OUTPATIENT
Start: 2025-05-31 | End: 2025-06-01 | Stop reason: HOSPADM

## 2025-05-31 RX ORDER — ALPRAZOLAM 0.5 MG/1
0.5 TABLET ORAL NIGHTLY PRN
Status: DISCONTINUED | OUTPATIENT
Start: 2025-05-31 | End: 2025-06-01 | Stop reason: HOSPADM

## 2025-05-31 RX ORDER — PANTOPRAZOLE SODIUM 40 MG/1
40 TABLET, DELAYED RELEASE ORAL DAILY
Status: DISCONTINUED | OUTPATIENT
Start: 2025-05-31 | End: 2025-06-01 | Stop reason: HOSPADM

## 2025-05-31 RX ADMIN — PANTOPRAZOLE SODIUM 40 MG: 40 TABLET, DELAYED RELEASE ORAL at 11:05

## 2025-05-31 RX ADMIN — METHYLPREDNISOLONE SODIUM SUCCINATE 1000 MG: 1 INJECTION, POWDER, LYOPHILIZED, FOR SOLUTION INTRAMUSCULAR; INTRAVENOUS at 05:05

## 2025-05-31 RX ADMIN — ATORVASTATIN CALCIUM 80 MG: 40 TABLET, FILM COATED ORAL at 08:05

## 2025-05-31 RX ADMIN — GADOBENATE DIMEGLUMINE 15 ML: 529 INJECTION, SOLUTION INTRAVENOUS at 12:05

## 2025-05-31 RX ADMIN — LISINOPRIL 10 MG: 10 TABLET ORAL at 11:05

## 2025-05-31 RX ADMIN — EZETIMIBE 10 MG: 10 TABLET ORAL at 11:05

## 2025-05-31 RX ADMIN — ALPRAZOLAM 0.5 MG: 0.5 TABLET ORAL at 01:05

## 2025-05-31 RX ADMIN — ALPRAZOLAM 0.5 MG: 0.5 TABLET ORAL at 08:05

## 2025-05-31 RX ADMIN — Medication 6 MG: at 10:05

## 2025-06-01 VITALS
TEMPERATURE: 99 F | HEIGHT: 69 IN | OXYGEN SATURATION: 95 % | RESPIRATION RATE: 18 BRPM | HEART RATE: 89 BPM | DIASTOLIC BLOOD PRESSURE: 85 MMHG | WEIGHT: 185.88 LBS | BODY MASS INDEX: 27.53 KG/M2 | SYSTOLIC BLOOD PRESSURE: 149 MMHG

## 2025-06-01 PROBLEM — H54.62 VISION LOSS, LEFT EYE: Status: ACTIVE | Noted: 2025-06-01

## 2025-06-01 PROBLEM — H46.9 OPTIC NEURITIS: Status: ACTIVE | Noted: 2025-06-01

## 2025-06-01 LAB
ANION GAP SERPL CALC-SCNC: 10 MEQ/L
BUN SERPL-MCNC: 20.2 MG/DL (ref 8.4–25.7)
CALCIUM SERPL-MCNC: 9 MG/DL (ref 8.8–10)
CHLORIDE SERPL-SCNC: 109 MMOL/L (ref 98–107)
CO2 SERPL-SCNC: 21 MMOL/L (ref 23–31)
CREAT SERPL-MCNC: 0.81 MG/DL (ref 0.72–1.25)
CREAT/UREA NIT SERPL: 25
EST. AVERAGE GLUCOSE BLD GHB EST-MCNC: 128.4 MG/DL
GFR SERPLBLD CREATININE-BSD FMLA CKD-EPI: >60 ML/MIN/1.73/M2
GLUCOSE SERPL-MCNC: 173 MG/DL (ref 82–115)
HBA1C MFR BLD: 6.1 %
POCT GLUCOSE: 179 MG/DL (ref 70–110)
POTASSIUM SERPL-SCNC: 4.2 MMOL/L (ref 3.5–5.1)
SODIUM SERPL-SCNC: 140 MMOL/L (ref 136–145)

## 2025-06-01 PROCEDURE — 83036 HEMOGLOBIN GLYCOSYLATED A1C: CPT | Performed by: INTERNAL MEDICINE

## 2025-06-01 PROCEDURE — 25000003 PHARM REV CODE 250: Performed by: INTERNAL MEDICINE

## 2025-06-01 PROCEDURE — 80048 BASIC METABOLIC PNL TOTAL CA: CPT | Performed by: INTERNAL MEDICINE

## 2025-06-01 PROCEDURE — 63600175 PHARM REV CODE 636 W HCPCS: Mod: JZ,TB | Performed by: INTERNAL MEDICINE

## 2025-06-01 PROCEDURE — 36415 COLL VENOUS BLD VENIPUNCTURE: CPT | Performed by: INTERNAL MEDICINE

## 2025-06-01 RX ORDER — PREDNISONE 10 MG/1
TABLET ORAL
Qty: 101 TABLET | Refills: 0 | Status: SHIPPED | OUTPATIENT
Start: 2025-06-01 | End: 2025-06-16

## 2025-06-01 RX ADMIN — PANTOPRAZOLE SODIUM 40 MG: 40 TABLET, DELAYED RELEASE ORAL at 09:06

## 2025-06-01 RX ADMIN — METHYLPREDNISOLONE SODIUM SUCCINATE 1000 MG: 1 INJECTION, POWDER, LYOPHILIZED, FOR SOLUTION INTRAMUSCULAR; INTRAVENOUS at 11:06

## 2025-06-01 RX ADMIN — EZETIMIBE 10 MG: 10 TABLET ORAL at 09:06

## 2025-06-01 RX ADMIN — LISINOPRIL 10 MG: 10 TABLET ORAL at 09:06

## 2025-06-03 ENCOUNTER — PATIENT OUTREACH (OUTPATIENT)
Dept: ADMINISTRATIVE | Facility: CLINIC | Age: 70
End: 2025-06-03
Payer: MEDICARE

## 2025-06-05 ENCOUNTER — LAB VISIT (OUTPATIENT)
Dept: LAB | Facility: HOSPITAL | Age: 70
End: 2025-06-05
Payer: MEDICARE

## 2025-06-05 ENCOUNTER — OFFICE VISIT (OUTPATIENT)
Dept: OPHTHALMOLOGY | Facility: CLINIC | Age: 70
End: 2025-06-05
Payer: MEDICARE

## 2025-06-05 VITALS — BODY MASS INDEX: 26.66 KG/M2 | WEIGHT: 180 LBS | HEIGHT: 69 IN

## 2025-06-05 DIAGNOSIS — H53.9 UNSPECIFIED VISUAL DISTURBANCE: ICD-10-CM

## 2025-06-05 DIAGNOSIS — H47.012 ISCHEMIC OPTIC NEUROPATHY OF LEFT EYE: ICD-10-CM

## 2025-06-05 DIAGNOSIS — I82.290 ACUTE EMBOLISM AND THROMBOSIS OF OTHER THORACIC VEINS: ICD-10-CM

## 2025-06-05 DIAGNOSIS — H47.011 ISCHEMIC OPTIC NEUROPATHY OF RIGHT EYE: Primary | ICD-10-CM

## 2025-06-05 DIAGNOSIS — H47.011 ISCHEMIC OPTIC NEUROPATHY OF RIGHT EYE: ICD-10-CM

## 2025-06-05 LAB
APTT PPP: 22 SECONDS (ref 23.2–33.7)
CRP SERPL-MCNC: <1 MG/L

## 2025-06-05 PROCEDURE — 85301 ANTITHROMBIN III ANTIGEN: CPT

## 2025-06-05 PROCEDURE — 36415 COLL VENOUS BLD VENIPUNCTURE: CPT

## 2025-06-05 PROCEDURE — 86147 CARDIOLIPIN ANTIBODY EA IG: CPT | Mod: 59

## 2025-06-05 PROCEDURE — 86140 C-REACTIVE PROTEIN: CPT

## 2025-06-05 PROCEDURE — 85306 CLOT INHIBIT PROT S FREE: CPT

## 2025-06-05 PROCEDURE — 85730 THROMBOPLASTIN TIME PARTIAL: CPT

## 2025-06-05 PROCEDURE — 99214 OFFICE O/P EST MOD 30 MIN: CPT | Mod: PBBFAC,PN

## 2025-06-05 PROCEDURE — 85302 CLOT INHIBIT PROT C ANTIGEN: CPT

## 2025-06-05 RX ORDER — MULTIVIT WITH CALCIUM,IRON,MIN 18MG-0.4MG
1 TABLET ORAL DAILY
COMMUNITY

## 2025-06-05 RX ORDER — TALC
3 POWDER (GRAM) TOPICAL NIGHTLY PRN
COMMUNITY

## 2025-06-06 LAB
AT III AG ACT/NOR PPP IA: 111 % (ref 80–120)
CARDIOLIPIN IGA QUANT (OHS): 2.3 APL U/ML
CARDIOLIPIN IGG QUANT (OHS): <0.5 GPL U/ML
CARDIOLIPIN IGM QUANT (OHS): 4.1 MPL U/ML
PROT S FREE AG ACT/NOR PPP IA: 131 % (ref 65–160)

## 2025-06-09 ENCOUNTER — TELEPHONE (OUTPATIENT)
Dept: ORTHOPEDICS | Facility: CLINIC | Age: 70
End: 2025-06-09
Payer: MEDICARE

## 2025-06-09 NOTE — TELEPHONE ENCOUNTER
Called pt to speak with him regarding his sx scheduled for 7/31/25. Spoke with pt, informed him that Dr. Horvath will be out at this time so sx will need to be rescheduled.     He stated that he is actually dealing with some other issues at this time so he would like to cancel this sx until he gets the other issues figured out.     Informed pt to call when he is ready to r/s and we can get this set up for him. Pt verbalized a clear understanding.

## 2025-06-10 LAB — PROT C AG ACT/NOR PPP IA: 179 % (ref 72–160)

## 2025-06-11 LAB
COAGULATION SPECIALIST REVIEW: NORMAL
COAGULATION SPECIALIST REVIEW: NORMAL
F2 C.20210G>A GENO BLD/T: NEGATIVE
F5 P.R506Q BLD/T QL: NEGATIVE
PROVIDER SIGNING NAME: NORMAL
PROVIDER SIGNING NAME: NORMAL

## 2025-06-12 ENCOUNTER — PATIENT MESSAGE (OUTPATIENT)
Dept: NEUROLOGY | Facility: CLINIC | Age: 70
End: 2025-06-12
Payer: MEDICARE

## 2025-06-25 ENCOUNTER — OFFICE VISIT (OUTPATIENT)
Facility: CLINIC | Age: 70
End: 2025-06-25
Payer: MEDICARE

## 2025-06-25 VITALS
BODY MASS INDEX: 26.66 KG/M2 | HEIGHT: 69 IN | SYSTOLIC BLOOD PRESSURE: 128 MMHG | DIASTOLIC BLOOD PRESSURE: 86 MMHG | WEIGHT: 180 LBS

## 2025-06-25 DIAGNOSIS — H47.012 ISCHEMIC OPTIC NEUROPATHY OF LEFT EYE: ICD-10-CM

## 2025-06-25 DIAGNOSIS — H47.011 ISCHEMIC OPTIC NEUROPATHY OF RIGHT EYE: ICD-10-CM

## 2025-06-25 DIAGNOSIS — G47.33 OSA (OBSTRUCTIVE SLEEP APNEA): Primary | ICD-10-CM

## 2025-06-25 PROCEDURE — 99214 OFFICE O/P EST MOD 30 MIN: CPT | Mod: PBBFAC

## 2025-06-25 PROCEDURE — 99999 PR PBB SHADOW E&M-EST. PATIENT-LVL IV: CPT | Mod: PBBFAC,,,

## 2025-06-25 NOTE — PROGRESS NOTES
Neurology Clinic  New Sleep    SUBJECTIVE:  Patient ID: Refugio Peterson is a 69 y.o. male.    Chief Complaint: New patient referred by Dr. Marito Whitlock for ANTONIETA evaluation    History of Present Illness:     New patient referred by Dr. Marito Whitlock for ANTONIETA evaluation    Had routine cataract surgery and subsequently lost vision in both eyes. Has had extensive work-up and has seen 5 ophthalmologists. Neuro-opthalmologist reportedly dx him with strokes in both eyes, dx with NAION and referred here to r/o ANTONIETA. He plans to get second opinion from neuro-ophthalmologist at [facility]    Not sleeping as well since the eye surgeries. Alternates b/w xanax .25mg and melatonin 2.5mg, slowly trying to wean off of sleep aids completely    Hx of  heart attack ~16 years ago.     Never had a SS done. Falls asleep in 15 min. Sleeps 9 hrs a night and awakens 1-2 times due to nocturia and is able to go right back to sleep. Awakens refreshed and occs has EDS. Does not nap. Does not snore and has not had witnessed apnea.         6/13/2025   EPWORTH SLEEPINESS SCALE   Sitting and reading 0   Watching TV 0   Sitting, inactive in a public place (e.g. a theatre or a meeting) 0   As a passenger in a car for an hour without a break 0   Lying down to rest in the afternoon when circumstances permit 0   Sitting and talking to someone 0   Sitting quietly after a lunch without alcohol 0   In a car, while stopped for a few minutes in traffic 0   Total score 0        Patient-reported       ROS: as per HPI, otherwise pertinent systems review is negative          Past Medical History:   Diagnosis Date    CAD (coronary artery disease) 2010    w/stent    Cataracts, bilateral     GERD (gastroesophageal reflux disease)     Heart attack 2010     maker    HLD (hyperlipidemia)     Hypertension        Past Surgical History:   Procedure Laterality Date    COLONOSCOPY      x3    HERNIA REPAIR  1961    KNEE SURGERY Left 1991     PHACOEMULSIFICATION, CATARACT, WITH IOL INSERTION Right 4/8/2025    Procedure: PHACOEMULSIFICATION, CATARACT, WITH IOL INSERTION     //////right eye; Panoptix Toric; Catalys;  Surgeon: Ellie Reyna MD;  Location: [address] OR;  Service: Ophthalmology;  Laterality: Right;    PHACOEMULSIFICATION, CATARACT, WITH IOL INSERTION Left 4/22/2025    Procedure: PHACOEMULSIFICATION, CATARACT, WITH IOL INSERTION     //////left eye; Panoptix Toric; Catalys; Atropine; Diamox;  Surgeon: Ellie Reyna MD;  Location: [address] OR;  Service: Ophthalmology;  Laterality: Left;       Family History   Problem Relation Name Age of Onset    Asthma Mother Pinky Peterson     Heart disease Mother Pinky Peterson     Cancer Father Refugio Deng        Social History     Socioeconomic History    Marital status:    Tobacco Use    Smoking status: Never     Passive exposure: Never    Smokeless tobacco: Never   Substance and Sexual Activity    Alcohol use: Not Currently     Comment: Not in 50 years    Drug use: Never    Sexual activity: Not Currently     Partners: Female     Review of patient's allergies indicates:  No Known Allergies    Current Outpatient Medications   Medication Instructions    ALPRAZolam (XANAX XR) 0.5 mg, As needed (PRN)    ascorbic acid (vitamin C) (VITAMIN C) 1,000 mg, Daily    [Paused] BABY ASPIRIN ORAL 81 mg, Every morning    cycloSPORINE 0.1 %, 4 times daily    ezetimibe (ZETIA) 10 mg tablet     glucosam-chond-hrb 149-hyal ac (GLUCOS CHOND CPLX ADVANCED) 750 mg-100 mg- 125 mg-1.65 mg Tab     glucosamine-chondroitin 500-400 mg Cap 1 capsule, Daily    lisinopriL 10 MG tablet     melatonin (MELATIN) 3 mg, Nightly PRN    multivitamin (ONE DAILY MULTIVITAMIN) per tablet 1 tablet, Daily    omeprazole (PRILOSEC) 20 MG capsule     rosuvastatin (CRESTOR) 20 mg    saw palmetto 500 MG capsule 2 times daily    vitamin D (VITAMIN D3) 1,000 Units, Daily    zinc gluconate 50 mg, Daily       OBJECTIVE:  Vitals:  BP  "128/86 (BP Location: Left arm, Patient Position: Sitting)   Ht 5' 9" (1.753 m)   Wt 81.6 kg (180 lb)   BMI 26.58 kg/m²      Neck Circumference: 17 in     Physical Exam   Constitutional: he appears well-developed and well-nourished.    Accompanied by - wife  appearance - well appearing, no apparent distress, unassisted  oropharynx - Mallampati score class 2, ok dentition  heart - regular rate and rhythm auscultated   lungs - pulmonary effort is normal. clear breath sounds  skin- no obvious lesions noted    Neurologic Exam:  Cortical function - The patient is alert, attentive, and oriented; cooperative with exam, good eye contact  Speech - clear and fluent   cranial nerves:  CN 2 - visual fields are full to confrontation. He says everything is "foggy".  CN 3, 4, 6 EOMs - normal. No ptosis or lateral gaze deviation  CN 7 - no face asymmetry; normal eye closure and smile  CN 8 - hearing is grossly normal  CN 9, 10 - palate elevates symmetrically.   CN 12 tongue - protrudes midline with normal movements and no atrophy  Motor - No pronator drift. Muscle bulk and tone normal. Arose from chair with arms folded. No lateralizing or focal deficits noted  Gait - unassisted, posture upright. gait is steady with normal steps, arm swing and turning.   Sensation - vib and light touch intact  Coordination - finger to nose intact.     Review of Data:   Prior/outside notes reviewed.       ASSESSMENT/PLAN:  1. ANTONIETA (obstructive sleep apnea)    HST then aim to re-visit to discuss next steps.       Sleep apnea and it's attendant risks discussed.  In lab PSG vs home sleep study option and insurance constraints discussed.  Potential need for treatment of ANTONIETA discussed including CPAP or Bilevel  PAP.   Alternatives to PAP discussed if PAP not ultimately tolerated.  Importance of healthy diet, regular exercise and sleep hygiene discussed    2. Ischemic optic neuropathy of right eye  3. Ischemic optic neuropathy of left eye    Managed by " ophthalmology and neuro-ophthalmology. R/o ANTONIETA as contributory factor.     Orders Placed This Encounter   Procedures    Home Sleep Study       Follow up TBD after HST.     Questions and concerns were sought and answered to the patient's stated verbal satisfaction.    The patient verbalizes understanding and agreement with the above stated treatment plan.   Items discussed include acute and/or chronic neurological, sleep, or other issues and their attendant differential diagnoses.  Potential for additional testing, treatment options, and prognosis also discussed.  Dr. Connor Clarke available during encounter.    Maricruz Mahoney, MSN, APRN, FNP-C  [facility]  [phone]

## 2025-07-07 ENCOUNTER — PROCEDURE VISIT (OUTPATIENT)
Dept: SLEEP MEDICINE | Facility: HOSPITAL | Age: 70
End: 2025-07-07
Payer: MEDICARE

## 2025-07-07 DIAGNOSIS — H47.012 ISCHEMIC OPTIC NEUROPATHY OF LEFT EYE: ICD-10-CM

## 2025-07-07 DIAGNOSIS — G47.33 OSA (OBSTRUCTIVE SLEEP APNEA): ICD-10-CM

## 2025-07-07 DIAGNOSIS — G47.34 NOCTURNAL HYPOXEMIA: Primary | ICD-10-CM

## 2025-07-07 DIAGNOSIS — H47.011 ISCHEMIC OPTIC NEUROPATHY OF RIGHT EYE: ICD-10-CM

## 2025-07-07 PROCEDURE — 95806 SLEEP STUDY UNATT&RESP EFFT: CPT | Mod: 26,,, | Performed by: SPECIALIST

## 2025-07-07 PROCEDURE — G0399 HOME SLEEP TEST/TYPE 3 PORTA: HCPCS

## 2025-08-23 ENCOUNTER — PATIENT MESSAGE (OUTPATIENT)
Dept: RESEARCH | Facility: HOSPITAL | Age: 70
End: 2025-08-23
Payer: MEDICARE

## (undated) DEVICE — Device

## (undated) DEVICE — PACK CASSETTE TUBE ELLIPS FX

## (undated) DEVICE — GLOVE SENSICARE PI MICRO 6

## (undated) DEVICE — TIP I & A

## (undated) DEVICE — TIP PHACO 30 DEG BEVEL 20GA

## (undated) DEVICE — ADAPTER DUAL NSL LUER M-M 7FT

## (undated) DEVICE — DRESSING TRANS 2X2 TEGADERM

## (undated) DEVICE — PACK EYE FOREMAN DISPOSABLE